# Patient Record
Sex: MALE | Race: WHITE | Employment: FULL TIME | URBAN - METROPOLITAN AREA
[De-identification: names, ages, dates, MRNs, and addresses within clinical notes are randomized per-mention and may not be internally consistent; named-entity substitution may affect disease eponyms.]

---

## 2019-05-09 ENCOUNTER — HOSPITAL ENCOUNTER (OUTPATIENT)
Age: 44
Discharge: HOME | End: 2019-05-09
Payer: MEDICARE

## 2019-05-09 LAB
INR BLD: 0.9
PROTHROMBIN TIME: 10.1 SEC (ref 9–12)

## 2019-05-09 PROCEDURE — 36415 COLL VENOUS BLD VENIPUNCTURE: CPT

## 2019-05-09 PROCEDURE — 85610 PROTHROMBIN TIME: CPT

## 2025-06-30 ENCOUNTER — HOSPITAL ENCOUNTER (INPATIENT)
Age: 50
LOS: 9 days | Discharge: HOME | DRG: 751 | End: 2025-07-09
Attending: EMERGENCY MEDICINE | Admitting: PSYCHIATRY & NEUROLOGY
Payer: MEDICAID

## 2025-06-30 ENCOUNTER — APPOINTMENT (OUTPATIENT)
Dept: CT IMAGING | Age: 50
DRG: 751 | End: 2025-06-30
Payer: MEDICAID

## 2025-06-30 DIAGNOSIS — T14.91XA SUICIDE ATTEMPT (HCC): Primary | ICD-10-CM

## 2025-06-30 DIAGNOSIS — R45.851 SUICIDAL IDEATION: ICD-10-CM

## 2025-06-30 PROBLEM — F32.2 MAJOR DEPRESSIVE DISORDER, SINGLE EPISODE, SEVERE WITHOUT PSYCHOTIC FEATURES (HCC): Status: ACTIVE | Noted: 2025-06-30

## 2025-06-30 PROBLEM — F32.A DEPRESSION WITH SUICIDAL IDEATION: Status: ACTIVE | Noted: 2025-06-30

## 2025-06-30 LAB
ALBUMIN SERPL-MCNC: 4.9 G/DL (ref 3.5–5.2)
ALP SERPL-CCNC: 60 U/L (ref 40–129)
ALT SERPL-CCNC: 35 U/L (ref 10–50)
AMPHET UR QL SCN: NEGATIVE
ANION GAP SERPL CALCULATED.3IONS-SCNC: 15 MMOL/L (ref 9–16)
APAP SERPL-MCNC: <5 UG/ML (ref 10–30)
AST SERPL-CCNC: 29 U/L (ref 10–50)
BACTERIA URNS QL MICRO: ABNORMAL
BARBITURATES UR QL SCN: NEGATIVE
BASOPHILS # BLD: 0.05 K/UL (ref 0–0.2)
BASOPHILS NFR BLD: 0 % (ref 0–2)
BENZODIAZ UR QL: NEGATIVE
BILIRUB DIRECT SERPL-MCNC: 0.2 MG/DL (ref 0–0.3)
BILIRUB INDIRECT SERPL-MCNC: 0.3 MG/DL (ref 0–1)
BILIRUB SERPL-MCNC: 0.5 MG/DL (ref 0–1.2)
BILIRUB UR QL STRIP: NEGATIVE
BUN SERPL-MCNC: 14 MG/DL (ref 6–20)
CALCIUM SERPL-MCNC: 9.8 MG/DL (ref 8.6–10.4)
CANNABINOIDS UR QL SCN: NEGATIVE
CASTS #/AREA URNS LPF: ABNORMAL /LPF
CHLORIDE SERPL-SCNC: 103 MMOL/L (ref 98–107)
CHOLEST SERPL-MCNC: 118 MG/DL (ref 0–199)
CHOLESTEROL/HDL RATIO: 5.1
CLARITY UR: CLEAR
CO2 SERPL-SCNC: 22 MMOL/L (ref 20–31)
COCAINE UR QL SCN: NEGATIVE
COLOR UR: YELLOW
CREAT SERPL-MCNC: 0.9 MG/DL (ref 0.7–1.2)
EOSINOPHIL # BLD: 0.12 K/UL (ref 0–0.44)
EOSINOPHILS RELATIVE PERCENT: 1 % (ref 0–4)
EPI CELLS #/AREA URNS HPF: ABNORMAL /HPF
ERYTHROCYTE [DISTWIDTH] IN BLOOD BY AUTOMATED COUNT: 13 % (ref 11.5–14.9)
ETHANOL PERCENT: 0 %
ETHANOLAMINE SERPL-MCNC: <10 MG/DL (ref 0–0.08)
FENTANYL UR QL: NEGATIVE
GFR, ESTIMATED: >90 ML/MIN/1.73M2
GLUCOSE BLD-MCNC: 143 MG/DL (ref 75–110)
GLUCOSE BLD-MCNC: 160 MG/DL (ref 75–110)
GLUCOSE BLD-MCNC: 181 MG/DL (ref 75–110)
GLUCOSE BLD-MCNC: 193 MG/DL (ref 75–110)
GLUCOSE BLD-MCNC: 203 MG/DL (ref 75–110)
GLUCOSE SERPL-MCNC: 147 MG/DL (ref 74–99)
GLUCOSE UR STRIP-MCNC: NEGATIVE MG/DL
HCT VFR BLD AUTO: 40.3 % (ref 41–53)
HDLC SERPL-MCNC: 23 MG/DL
HGB BLD-MCNC: 13.5 G/DL (ref 13.5–17.5)
HGB UR QL STRIP.AUTO: NEGATIVE
IMM GRANULOCYTES # BLD AUTO: 0.07 K/UL (ref 0–0.3)
IMM GRANULOCYTES NFR BLD: 1 %
KETONES UR STRIP-MCNC: ABNORMAL MG/DL
LDLC SERPL CALC-MCNC: 49 MG/DL (ref 0–100)
LEUKOCYTE ESTERASE UR QL STRIP: NEGATIVE
LEVETIRACETAM SERPL-MCNC: 33 UG/ML
LIPASE SERPL-CCNC: 44 U/L (ref 13–60)
LYMPHOCYTES NFR BLD: 2.5 K/UL (ref 1.1–3.7)
LYMPHOCYTES RELATIVE PERCENT: 22 % (ref 24–44)
MCH RBC QN AUTO: 28.2 PG (ref 26–34)
MCHC RBC AUTO-ENTMCNC: 33.5 G/DL (ref 31–37)
MCV RBC AUTO: 84.1 FL (ref 80–100)
METHADONE UR QL: NEGATIVE
MONOCYTES NFR BLD: 0.66 K/UL (ref 0.1–1.2)
MONOCYTES NFR BLD: 6 % (ref 3–12)
NEUTROPHILS NFR BLD: 70 % (ref 36–66)
NEUTS SEG NFR BLD: 7.76 K/UL (ref 1.5–8.1)
NITRITE UR QL STRIP: NEGATIVE
NRBC BLD-RTO: 0 PER 100 WBC
OPIATES UR QL SCN: NEGATIVE
OXYCODONE UR QL SCN: NEGATIVE
PCP UR QL SCN: NEGATIVE
PH UR STRIP: 5 [PH] (ref 5–8)
PLATELET # BLD AUTO: 367 K/UL (ref 150–450)
PMV BLD AUTO: 10.1 FL (ref 8–13.5)
POTASSIUM SERPL-SCNC: 3.4 MMOL/L (ref 3.7–5.3)
PROT SERPL-MCNC: 7.7 G/DL (ref 6.6–8.7)
PROT UR STRIP-MCNC: ABNORMAL MG/DL
RBC # BLD AUTO: 4.79 M/UL (ref 4.21–5.77)
RBC #/AREA URNS HPF: ABNORMAL /HPF
SALICYLATES SERPL-MCNC: <1 MG/DL (ref 0–10)
SODIUM SERPL-SCNC: 140 MMOL/L (ref 136–145)
SP GR UR STRIP: 1.02 (ref 1–1.03)
TEST INFORMATION: NORMAL
TRIGL SERPL-MCNC: 228 MG/DL (ref 0–149)
UROBILINOGEN UR STRIP-ACNC: NORMAL EU/DL (ref 0–1)
WBC #/AREA URNS HPF: ABNORMAL /HPF
WBC OTHER # BLD: 11.2 K/UL (ref 3.5–11)

## 2025-06-30 PROCEDURE — 81001 URINALYSIS AUTO W/SCOPE: CPT

## 2025-06-30 PROCEDURE — 80307 DRUG TEST PRSMV CHEM ANLYZR: CPT

## 2025-06-30 PROCEDURE — 83690 ASSAY OF LIPASE: CPT

## 2025-06-30 PROCEDURE — 70450 CT HEAD/BRAIN W/O DYE: CPT

## 2025-06-30 PROCEDURE — 99223 1ST HOSP IP/OBS HIGH 75: CPT | Performed by: PSYCHIATRY & NEUROLOGY

## 2025-06-30 PROCEDURE — 99285 EMERGENCY DEPT VISIT HI MDM: CPT

## 2025-06-30 PROCEDURE — 6370000000 HC RX 637 (ALT 250 FOR IP): Performed by: NURSE PRACTITIONER

## 2025-06-30 PROCEDURE — 82947 ASSAY GLUCOSE BLOOD QUANT: CPT

## 2025-06-30 PROCEDURE — 80061 LIPID PANEL: CPT

## 2025-06-30 PROCEDURE — 85025 COMPLETE CBC W/AUTO DIFF WBC: CPT

## 2025-06-30 PROCEDURE — 1240000000 HC EMOTIONAL WELLNESS R&B

## 2025-06-30 PROCEDURE — 6370000000 HC RX 637 (ALT 250 FOR IP): Performed by: INTERNAL MEDICINE

## 2025-06-30 PROCEDURE — 80076 HEPATIC FUNCTION PANEL: CPT

## 2025-06-30 PROCEDURE — 80143 DRUG ASSAY ACETAMINOPHEN: CPT

## 2025-06-30 PROCEDURE — G0480 DRUG TEST DEF 1-7 CLASSES: HCPCS

## 2025-06-30 PROCEDURE — 36415 COLL VENOUS BLD VENIPUNCTURE: CPT

## 2025-06-30 PROCEDURE — 99222 1ST HOSP IP/OBS MODERATE 55: CPT | Performed by: INTERNAL MEDICINE

## 2025-06-30 PROCEDURE — 80179 DRUG ASSAY SALICYLATE: CPT

## 2025-06-30 PROCEDURE — 80048 BASIC METABOLIC PNL TOTAL CA: CPT

## 2025-06-30 PROCEDURE — 80177 DRUG SCRN QUAN LEVETIRACETAM: CPT

## 2025-06-30 PROCEDURE — APPSS60 APP SPLIT SHARED TIME 46-60 MINUTES: Performed by: NURSE PRACTITIONER

## 2025-06-30 RX ORDER — ATORVASTATIN CALCIUM 80 MG/1
80 TABLET, FILM COATED ORAL NIGHTLY
Status: ON HOLD | COMMUNITY
End: 2025-07-09

## 2025-06-30 RX ORDER — DEXTROSE MONOHYDRATE 100 MG/ML
INJECTION, SOLUTION INTRAVENOUS CONTINUOUS PRN
Status: DISCONTINUED | OUTPATIENT
Start: 2025-06-30 | End: 2025-07-09 | Stop reason: HOSPADM

## 2025-06-30 RX ORDER — HYDROXYZINE HYDROCHLORIDE 50 MG/1
50 TABLET, FILM COATED ORAL 3 TIMES DAILY PRN
Status: DISCONTINUED | OUTPATIENT
Start: 2025-06-30 | End: 2025-07-09 | Stop reason: HOSPADM

## 2025-06-30 RX ORDER — IBUPROFEN 400 MG/1
400 TABLET, FILM COATED ORAL EVERY 6 HOURS PRN
Status: DISCONTINUED | OUTPATIENT
Start: 2025-06-30 | End: 2025-07-09 | Stop reason: HOSPADM

## 2025-06-30 RX ORDER — LEVETIRACETAM 500 MG/1
1000 TABLET ORAL 2 TIMES DAILY
Status: DISCONTINUED | OUTPATIENT
Start: 2025-06-30 | End: 2025-07-01

## 2025-06-30 RX ORDER — LEVETIRACETAM 500 MG/1
2000 TABLET, FILM COATED, EXTENDED RELEASE ORAL DAILY
Status: DISCONTINUED | OUTPATIENT
Start: 2025-06-30 | End: 2025-06-30

## 2025-06-30 RX ORDER — ASPIRIN 81 MG/1
81 TABLET, CHEWABLE ORAL DAILY
Status: DISCONTINUED | OUTPATIENT
Start: 2025-06-30 | End: 2025-07-01

## 2025-06-30 RX ORDER — INSULIN LISPRO 100 [IU]/ML
0-8 INJECTION, SOLUTION INTRAVENOUS; SUBCUTANEOUS
Status: DISCONTINUED | OUTPATIENT
Start: 2025-06-30 | End: 2025-06-30 | Stop reason: SDUPTHER

## 2025-06-30 RX ORDER — INSULIN LISPRO 100 [IU]/ML
0-8 INJECTION, SOLUTION INTRAVENOUS; SUBCUTANEOUS
Status: DISCONTINUED | OUTPATIENT
Start: 2025-06-30 | End: 2025-07-01

## 2025-06-30 RX ORDER — POTASSIUM CHLORIDE 1500 MG/1
40 TABLET, EXTENDED RELEASE ORAL ONCE
Status: COMPLETED | OUTPATIENT
Start: 2025-06-30 | End: 2025-06-30

## 2025-06-30 RX ORDER — ACETAMINOPHEN 325 MG/1
650 TABLET ORAL EVERY 4 HOURS PRN
Status: DISCONTINUED | OUTPATIENT
Start: 2025-06-30 | End: 2025-07-09 | Stop reason: HOSPADM

## 2025-06-30 RX ORDER — FENOFIBRATE 54 MG/1
54 TABLET ORAL DAILY
Status: DISCONTINUED | OUTPATIENT
Start: 2025-06-30 | End: 2025-07-09 | Stop reason: HOSPADM

## 2025-06-30 RX ORDER — MAGNESIUM HYDROXIDE/ALUMINUM HYDROXICE/SIMETHICONE 120; 1200; 1200 MG/30ML; MG/30ML; MG/30ML
30 SUSPENSION ORAL EVERY 6 HOURS PRN
Status: DISCONTINUED | OUTPATIENT
Start: 2025-06-30 | End: 2025-07-09 | Stop reason: HOSPADM

## 2025-06-30 RX ORDER — FENOFIBRATE 160 MG/1
160 TABLET ORAL DAILY
Status: ON HOLD | COMMUNITY
End: 2025-07-09 | Stop reason: HOSPADM

## 2025-06-30 RX ORDER — CHLORAL HYDRATE 500 MG
1000 CAPSULE ORAL DAILY
COMMUNITY

## 2025-06-30 RX ORDER — ATORVASTATIN CALCIUM 20 MG/1
80 TABLET, FILM COATED ORAL NIGHTLY
Status: DISCONTINUED | OUTPATIENT
Start: 2025-06-30 | End: 2025-07-09 | Stop reason: HOSPADM

## 2025-06-30 RX ORDER — LEVETIRACETAM 500 MG/1
2000 TABLET, FILM COATED, EXTENDED RELEASE ORAL 2 TIMES DAILY
COMMUNITY
Start: 2024-09-11

## 2025-06-30 RX ORDER — OMEGA-3-ACID ETHYL ESTERS 1 G/1
1 CAPSULE, LIQUID FILLED ORAL DAILY
Status: DISCONTINUED | OUTPATIENT
Start: 2025-06-30 | End: 2025-07-09 | Stop reason: HOSPADM

## 2025-06-30 RX ORDER — LACOSAMIDE 100 MG/1
100 TABLET ORAL 2 TIMES DAILY
COMMUNITY
Start: 2024-09-11

## 2025-06-30 RX ORDER — TRAZODONE HYDROCHLORIDE 50 MG/1
50 TABLET ORAL NIGHTLY PRN
Status: DISCONTINUED | OUTPATIENT
Start: 2025-06-30 | End: 2025-07-09 | Stop reason: HOSPADM

## 2025-06-30 RX ORDER — POLYETHYLENE GLYCOL 3350 17 G
2 POWDER IN PACKET (EA) ORAL
Status: DISCONTINUED | OUTPATIENT
Start: 2025-06-30 | End: 2025-07-09 | Stop reason: HOSPADM

## 2025-06-30 RX ORDER — LACOSAMIDE 100 MG/1
100 TABLET ORAL 2 TIMES DAILY
Status: DISCONTINUED | OUTPATIENT
Start: 2025-06-30 | End: 2025-07-09 | Stop reason: HOSPADM

## 2025-06-30 RX ORDER — RAMIPRIL 5 MG/1
5 CAPSULE ORAL DAILY
COMMUNITY

## 2025-06-30 RX ORDER — POLYETHYLENE GLYCOL 3350 17 G/17G
17 POWDER, FOR SOLUTION ORAL DAILY PRN
Status: DISCONTINUED | OUTPATIENT
Start: 2025-06-30 | End: 2025-07-09 | Stop reason: HOSPADM

## 2025-06-30 RX ADMIN — POTASSIUM CHLORIDE 40 MEQ: 1500 TABLET, EXTENDED RELEASE ORAL at 15:30

## 2025-06-30 RX ADMIN — LEVETIRACETAM 1000 MG: 500 TABLET, FILM COATED ORAL at 10:19

## 2025-06-30 RX ADMIN — LACOSAMIDE 100 MG: 100 TABLET, FILM COATED ORAL at 10:19

## 2025-06-30 RX ADMIN — METFORMIN HYDROCHLORIDE 1000 MG: 1000 TABLET ORAL at 17:46

## 2025-06-30 RX ADMIN — FENOFIBRATE 54 MG: 54 TABLET ORAL at 15:30

## 2025-06-30 RX ADMIN — ATORVASTATIN CALCIUM 80 MG: 20 TABLET, FILM COATED ORAL at 21:13

## 2025-06-30 RX ADMIN — LEVETIRACETAM 1000 MG: 500 TABLET, FILM COATED ORAL at 21:13

## 2025-06-30 RX ADMIN — LACOSAMIDE 100 MG: 100 TABLET, FILM COATED ORAL at 21:14

## 2025-06-30 RX ADMIN — OMEGA-3-ACID ETHYL ESTERS CAPSULES 1 CAPSULE: 1 CAPSULE, LIQUID FILLED ORAL at 15:30

## 2025-06-30 ASSESSMENT — LIFESTYLE VARIABLES
HOW OFTEN DO YOU HAVE A DRINK CONTAINING ALCOHOL: NEVER
HOW OFTEN DO YOU HAVE A DRINK CONTAINING ALCOHOL: NEVER
HOW MANY STANDARD DRINKS CONTAINING ALCOHOL DO YOU HAVE ON A TYPICAL DAY: PATIENT DOES NOT DRINK
HOW OFTEN DO YOU HAVE A DRINK CONTAINING ALCOHOL: NEVER
HOW OFTEN DO YOU HAVE A DRINK CONTAINING ALCOHOL: NEVER

## 2025-06-30 ASSESSMENT — PATIENT HEALTH QUESTIONNAIRE - PHQ9
SUM OF ALL RESPONSES TO PHQ QUESTIONS 1-9: 2
SUM OF ALL RESPONSES TO PHQ QUESTIONS 1-9: 2
1. LITTLE INTEREST OR PLEASURE IN DOING THINGS: SEVERAL DAYS
SUM OF ALL RESPONSES TO PHQ QUESTIONS 1-9: 2
SUM OF ALL RESPONSES TO PHQ QUESTIONS 1-9: 2
2. FEELING DOWN, DEPRESSED OR HOPELESS: SEVERAL DAYS

## 2025-06-30 ASSESSMENT — SLEEP AND FATIGUE QUESTIONNAIRES
DO YOU HAVE DIFFICULTY SLEEPING: YES
SLEEP PATTERN: DIFFICULTY FALLING ASLEEP;DISTURBED/INTERRUPTED SLEEP
DO YOU USE A SLEEP AID: NO
AVERAGE NUMBER OF SLEEP HOURS: 4

## 2025-06-30 ASSESSMENT — PAIN - FUNCTIONAL ASSESSMENT: PAIN_FUNCTIONAL_ASSESSMENT: NONE - DENIES PAIN

## 2025-06-30 NOTE — ED TRIAGE NOTES
Mode of arrival (squad #, walk in, police, etc) : [employer]        Chief complaint(s): suicide ideation with plan/ attempt         Arrival Note (brief scenario, treatment PTA, etc).: attempted to overdose on tylenol last week, reported to therapist who told him to call police if he felt he would do it again.           Deferred [x]      Reason for deferring: N/A    *If yes to two or more: probable alcohol abuse.*

## 2025-06-30 NOTE — ED PROVIDER NOTES
EMERGENCY DEPARTMENT ENCOUNTER    Pt Name: Dima Olmos  MRN: [id number]  Birthdate 1975  Date of evaluation: 6/30/25  CHIEF COMPLAINT       Chief Complaint   Patient presents with    Mental Health Problem     HISTORY OF PRESENT ILLNESS   49-year-old male presenting to the ER complaining of suicidal ideations.  Patient states he did attempt to overdose with 6 Tylenol's 6 days ago.    The history is provided by the patient.   Mental Health Problem  Presenting symptoms: suicidal thoughts    Presenting symptoms: no agitation    Associated symptoms: no abdominal pain, no chest pain and no fatigue            REVIEW OF SYSTEMS     Review of Systems   Constitutional:  Negative for activity change, fatigue and fever.   HENT:  Negative for congestion, ear pain and trouble swallowing.    Eyes:  Negative for photophobia and visual disturbance.   Respiratory:  Negative for cough and shortness of breath.    Cardiovascular:  Negative for chest pain and palpitations.   Gastrointestinal:  Negative for abdominal pain, diarrhea, nausea and vomiting.   Genitourinary:  Negative for dysuria, flank pain and urgency.   Musculoskeletal:  Negative for arthralgias and myalgias.   Skin:  Negative for color change and rash.   Neurological:  Negative for dizziness and facial asymmetry.   Psychiatric/Behavioral:  Positive for suicidal ideas. Negative for agitation and behavioral problems.      PASTMEDICAL HISTORY     Past Medical History:   Diagnosis Date    Diabetes (HCC)     Epilepsy (HCC)      Past Problem List  Patient Active Problem List   Diagnosis Code    Depression with suicidal ideation F32.A, R45.851     SURGICAL HISTORY     No past surgical history on file.  CURRENT MEDICATIONS       There are no discharge medications for this patient.    ALLERGIES     is allergic to demerol hcl [meperidine], depakote [divalproex sodium], and hydromorphone.  FAMILY HISTORY     has no family status information on file.      SOCIAL HISTORY       Social

## 2025-06-30 NOTE — H&P
has had 2 previous suicide attempts, both by overdose.  He endorses active suicidal thoughts at this time.  He is not able to elicit any manic/hypomanic episodes.  Denies any history of perceptual disturbances or psychotic phenomena.  Patient does report history of anxiety as noted above.  States that his anxiety has been worse recently and identifies ruminating on his stressors to the point of significant distress.  He also identifies difficulty focusing, psychomotor agitation with restlessness, chest tightness, heart palpitations, and racing thoughts.  He is uncertain if he is ever experienced any panic/anxiety attacks.      Patient is in emotional distress and expressing active suicidal ideation.  He requires inpatient hospitalization for stabilization.           History of head trauma: [] Yes [x] No    History of seizures: [x] Yes [] No -sees a neurologist.  Has been seizure-free since 2016.  Takes Keppra.    History of violence or aggression: [] Yes [x] No         PSYCHIATRIC HISTORY:  [x] Yes [] No    Currently follows with a therapist Giacomo Mora  Reports multiple lifetime suicide attempts  First time psychiatric hospital admissions    Home Medication Compliance: [x] Yes [] No    Past psychiatric medications includes: Escitalopram    Adverse reactions from psychotropic medications: [x] Yes [] No  Depakote - took for seizures, reports did not tolerate some of his anticonvulsants well and developed pancreatitis         Lifetime Psychiatric Review of Systems         Depression: Endorses     Anxiety: Endorses     Panic Attacks: Denies     Carlee or Hypomania: Denies     Phobias: Denies     Obsessions and Compulsions: Denies     Body or Vocal Tics: Denies     Visual Hallucinations: Denies     Auditory Hallucinations: Denies     Delusions/Paranoia: Denies     PTSD: Denies    Past Medical History:        Diagnosis Date    Diabetes (HCC)     Epilepsy (HCC)        Past Surgical History:    History reviewed. No 
      Plan:   49-year-old male admitted at [facility] for further management of depression suicidal ideation  Type 2 diabetes mellitus, on metformin give sliding scale check HbA1c  Hypokalemia replace potassium  Angina, patient currently denies any chest pain already been seen by cardiology as outpatient recommended echocardiogram and stress as outpatient, will start aspirin  Hyperlipidemia on Lipitor and fenofibrate, check lipid panel  Leukocytosis likely reactive no urinary symptoms  Hypertension blood pressure on the borderline lower side on ramipril will hold continue to monitor  Epilepsy on Vimpat and Keppra  Consultations:   IP CONSULT TO INTERNAL MEDICINE      Katerina Norman MD  6/30/2025  1:41 PM    Copy sent to Christine Ott, DO    Please note that this chart was generated using voice recognition Dragon dictation software.  Although every effort was made to ensure the accuracy of this automated transcription, some errors in transcription may have occurred.

## 2025-06-30 NOTE — CARE COORDINATION
[facility] Biopsychosocial Assessment    Current Level of Psychosocial Functioning     Independent   Dependent    Minimal Assist X      Psychosocial High Risk Factors (check all that apply)    Unable to obtain meds   Chronic illness/pain    Substance abuse   Lack of Family Support X  Financial stress X  Isolation X  Inadequate Community Resources  Suicide attempt(s) X  Not taking medications X  Victim of crime   Developmental Delay  Unable to manage personal needs  X  Age 65 or older   Homeless  No transportation   Readmission within 30 days  Unemployment X  Traumatic Event      Psychiatric Advanced Directives: none reported     Family to Involve in Treatment: Lack of family support     Sexual Orientation:  VIKI    Patient Strengths: adequate housing, follows up with a therapist, insurance     Patient Barriers: presents on admission following suicide attempt of overdosing on Tylenol medication, relationship issues with his wife, recent increase in symptoms of Depression, not linked with [facility], not taking Psychiatric medications.     Opiate Education Provided: N/A Patient denies and does not have any documented history of Opiate or Heroin use/abuse. Patient denies any Alcohol or Illicit drug use. Patient's drug screen upon admission was negative for all substances.      [facility]/mental health history: Not linked for medications, sees Therapist Giacomo Mora, [facility] [phone], relationship issues with his wife, unsure if he is able to return home, no income at this time,no current or past ANGÉLICA issues     Plan of Care   medication management, group/individual therapies, family meetings, psycho -education, treatment team meetings to assist with stabilization    Initial Discharge Plan:  To Be Determined. Patient will be provided with Community resource information       Clinical Summary:  Dima Olmos is a 49 y.o. male who presented on admission with active suicidal ideation with a plan to overdose.  Patient had

## 2025-06-30 NOTE — GROUP NOTE
Group Therapy Note    Date: 6/30/2025    Group Start Time: 1330  Group End Time: 1400  Group Topic: Healthy Living/Wellness    [facility]    Giovanna Quach CTRS        Group Therapy Note    Attendees: 5/14     Topic: To increase social interaction , education and discussion r/t benefits of peer support group   [facility] resources, services, and locations by Guest Presenter.           Comments:   Patient did not participate in Healthy Living and Wellness Group (Presentation by [facility] Organization)   at 13:30, despite staff encouragement and explanation of benefits. Pt was seclusive to self and room.     Q15 minute safety checks maintained for patient safety and will continue to encourage patient to   attend unit programming.         Discipline Responsible: Psychoeducational Specialist   Signature: REMY GIL

## 2025-06-30 NOTE — GROUP NOTE
Group Therapy Note    Date: 6/30/2025    Group Start Time: 1100  Group End Time: 1130  Group Topic: Nursing    [facility]    Stephanie Bennett LPN        Group Therapy Note    Attendees: 4/14       Patient was offered group therapy today but declined to participate despite encouragement from staff. 1:1 was offered.           Signature:  Stephanie Bennett LPN

## 2025-07-01 LAB — GLUCOSE BLD-MCNC: 135 MG/DL (ref 75–110)

## 2025-07-01 PROCEDURE — 99232 SBSQ HOSP IP/OBS MODERATE 35: CPT | Performed by: INTERNAL MEDICINE

## 2025-07-01 PROCEDURE — APPSS30 APP SPLIT SHARED TIME 16-30 MINUTES

## 2025-07-01 PROCEDURE — 99232 SBSQ HOSP IP/OBS MODERATE 35: CPT | Performed by: PSYCHIATRY & NEUROLOGY

## 2025-07-01 PROCEDURE — 6370000000 HC RX 637 (ALT 250 FOR IP): Performed by: PSYCHIATRY & NEUROLOGY

## 2025-07-01 PROCEDURE — 6370000000 HC RX 637 (ALT 250 FOR IP): Performed by: NURSE PRACTITIONER

## 2025-07-01 PROCEDURE — 1240000000 HC EMOTIONAL WELLNESS R&B

## 2025-07-01 PROCEDURE — 90833 PSYTX W PT W E/M 30 MIN: CPT | Performed by: PSYCHIATRY & NEUROLOGY

## 2025-07-01 PROCEDURE — 6370000000 HC RX 637 (ALT 250 FOR IP): Performed by: INTERNAL MEDICINE

## 2025-07-01 PROCEDURE — 82947 ASSAY GLUCOSE BLOOD QUANT: CPT

## 2025-07-01 RX ORDER — LISINOPRIL 20 MG/1
20 TABLET ORAL DAILY
Status: DISCONTINUED | OUTPATIENT
Start: 2025-07-01 | End: 2025-07-09 | Stop reason: HOSPADM

## 2025-07-01 RX ORDER — LEVETIRACETAM 500 MG/1
2000 TABLET ORAL 2 TIMES DAILY
Status: DISCONTINUED | OUTPATIENT
Start: 2025-07-01 | End: 2025-07-09 | Stop reason: HOSPADM

## 2025-07-01 RX ORDER — MIRTAZAPINE 15 MG/1
7.5 TABLET, FILM COATED ORAL NIGHTLY
Status: DISCONTINUED | OUTPATIENT
Start: 2025-07-01 | End: 2025-07-02

## 2025-07-01 RX ORDER — OLANZAPINE 5 MG/1
2.5 TABLET, ORALLY DISINTEGRATING ORAL 2 TIMES DAILY
Status: DISCONTINUED | OUTPATIENT
Start: 2025-07-01 | End: 2025-07-02

## 2025-07-01 RX ADMIN — LISINOPRIL 20 MG: 20 TABLET ORAL at 15:06

## 2025-07-01 RX ADMIN — MIRTAZAPINE 7.5 MG: 15 TABLET, FILM COATED ORAL at 20:57

## 2025-07-01 RX ADMIN — LACOSAMIDE 100 MG: 100 TABLET, FILM COATED ORAL at 07:54

## 2025-07-01 RX ADMIN — ATORVASTATIN CALCIUM 80 MG: 20 TABLET, FILM COATED ORAL at 20:57

## 2025-07-01 RX ADMIN — OMEGA-3-ACID ETHYL ESTERS CAPSULES 1 CAPSULE: 1 CAPSULE, LIQUID FILLED ORAL at 07:58

## 2025-07-01 RX ADMIN — METFORMIN HYDROCHLORIDE 1000 MG: 1000 TABLET ORAL at 07:54

## 2025-07-01 RX ADMIN — METFORMIN HYDROCHLORIDE 1000 MG: 1000 TABLET ORAL at 17:29

## 2025-07-01 RX ADMIN — LEVETIRACETAM 1000 MG: 500 TABLET, FILM COATED ORAL at 07:54

## 2025-07-01 RX ADMIN — OLANZAPINE 2.5 MG: 5 TABLET, ORALLY DISINTEGRATING ORAL at 20:57

## 2025-07-01 RX ADMIN — OLANZAPINE 2.5 MG: 5 TABLET, ORALLY DISINTEGRATING ORAL at 15:14

## 2025-07-01 RX ADMIN — LEVETIRACETAM 2000 MG: 500 TABLET, FILM COATED ORAL at 20:57

## 2025-07-01 RX ADMIN — FENOFIBRATE 54 MG: 54 TABLET ORAL at 07:58

## 2025-07-01 RX ADMIN — LACOSAMIDE 100 MG: 100 TABLET, FILM COATED ORAL at 20:57

## 2025-07-01 ASSESSMENT — PAIN SCALES - GENERAL: PAINLEVEL_OUTOF10: 0

## 2025-07-01 NOTE — GROUP NOTE
Group Therapy Note    Date: 7/1/2025    Group Start Time: 1015  Group End Time: 1045  Group Topic: Psychoeducation    [facility]    Andrea Mcclain        Group Therapy Note    Attendees: 5/10     Patient was offered group therapy today but declined to participate despite encouragement from staff. 1:1 was offered.    Signature:  Andrea Mcclain

## 2025-07-01 NOTE — GROUP NOTE
patient refused to attend goal group at 0900 after encouragement from staff.  1:1 talk time provided as alternative to group session

## 2025-07-01 NOTE — GROUP NOTE
Group Therapy Note    Date: 7/1/2025    Group Start Time: 1100  Group End Time: 1145  Group Topic: Cognitive Skills    [facility]    Giovanna Quach CTRS        Group Therapy Note    Attendees: 5/10     Topic: To increase social interaction , practice problem solving, making connections with ideas, and   communication skills.           Comments:   Patient did not participate in Cognitive Skills Group at 11:00, despite staff encouragement and explanation of benefits. Pt was seclusive to self and room.     Q15 minute safety checks maintained for patient safety and will continue to encourage patient to   attend unit programming.         Discipline Responsible: Psychoeducational Specialist   Signature: REMY GIL

## 2025-07-02 PROCEDURE — 90833 PSYTX W PT W E/M 30 MIN: CPT | Performed by: PSYCHIATRY & NEUROLOGY

## 2025-07-02 PROCEDURE — 6370000000 HC RX 637 (ALT 250 FOR IP): Performed by: INTERNAL MEDICINE

## 2025-07-02 PROCEDURE — APPSS30 APP SPLIT SHARED TIME 16-30 MINUTES

## 2025-07-02 PROCEDURE — 6370000000 HC RX 637 (ALT 250 FOR IP): Performed by: PSYCHIATRY & NEUROLOGY

## 2025-07-02 PROCEDURE — 99232 SBSQ HOSP IP/OBS MODERATE 35: CPT | Performed by: PSYCHIATRY & NEUROLOGY

## 2025-07-02 PROCEDURE — 1240000000 HC EMOTIONAL WELLNESS R&B

## 2025-07-02 PROCEDURE — 6370000000 HC RX 637 (ALT 250 FOR IP): Performed by: NURSE PRACTITIONER

## 2025-07-02 PROCEDURE — 99232 SBSQ HOSP IP/OBS MODERATE 35: CPT | Performed by: INTERNAL MEDICINE

## 2025-07-02 RX ORDER — OLANZAPINE 5 MG/1
2.5 TABLET, ORALLY DISINTEGRATING ORAL NIGHTLY
Status: DISCONTINUED | OUTPATIENT
Start: 2025-07-02 | End: 2025-07-03

## 2025-07-02 RX ORDER — MIRTAZAPINE 15 MG/1
15 TABLET, FILM COATED ORAL NIGHTLY
Status: DISCONTINUED | OUTPATIENT
Start: 2025-07-02 | End: 2025-07-09 | Stop reason: HOSPADM

## 2025-07-02 RX ADMIN — ATORVASTATIN CALCIUM 80 MG: 20 TABLET, FILM COATED ORAL at 20:53

## 2025-07-02 RX ADMIN — LACOSAMIDE 100 MG: 100 TABLET, FILM COATED ORAL at 08:33

## 2025-07-02 RX ADMIN — OLANZAPINE 2.5 MG: 5 TABLET, ORALLY DISINTEGRATING ORAL at 08:33

## 2025-07-02 RX ADMIN — LISINOPRIL 20 MG: 20 TABLET ORAL at 08:33

## 2025-07-02 RX ADMIN — LEVETIRACETAM 2000 MG: 500 TABLET, FILM COATED ORAL at 20:52

## 2025-07-02 RX ADMIN — OLANZAPINE 2.5 MG: 5 TABLET, ORALLY DISINTEGRATING ORAL at 20:53

## 2025-07-02 RX ADMIN — OMEGA-3-ACID ETHYL ESTERS CAPSULES 1 CAPSULE: 1 CAPSULE, LIQUID FILLED ORAL at 08:33

## 2025-07-02 RX ADMIN — METFORMIN HYDROCHLORIDE 1000 MG: 1000 TABLET ORAL at 17:10

## 2025-07-02 RX ADMIN — LACOSAMIDE 100 MG: 100 TABLET, FILM COATED ORAL at 20:52

## 2025-07-02 RX ADMIN — MIRTAZAPINE 15 MG: 15 TABLET, FILM COATED ORAL at 20:52

## 2025-07-02 RX ADMIN — FENOFIBRATE 54 MG: 54 TABLET ORAL at 08:33

## 2025-07-02 RX ADMIN — METFORMIN HYDROCHLORIDE 1000 MG: 1000 TABLET ORAL at 08:33

## 2025-07-02 RX ADMIN — LEVETIRACETAM 2000 MG: 500 TABLET, FILM COATED ORAL at 08:33

## 2025-07-02 ASSESSMENT — LIFESTYLE VARIABLES
HOW OFTEN DO YOU HAVE A DRINK CONTAINING ALCOHOL: NEVER
HOW MANY STANDARD DRINKS CONTAINING ALCOHOL DO YOU HAVE ON A TYPICAL DAY: PATIENT DOES NOT DRINK

## 2025-07-02 NOTE — GROUP NOTE
Group Therapy Note    Date: 7/2/2025    Group Start Time: 1100  Group End Time: 1150  Group Topic: Recreational    [facility]    Giovanna Quach CTRS        Group Therapy Note    Attendees: 2/9     Topic: To increase social interaction , practice self expression, relating to peers through discussion about leisure skills and interests.           Comments:   Patient did not participate in Cognitive Skills Group at 11:00, despite staff encouragement and explanation   of benefits. Pt was seclusive to self and room.     Q15 minute safety checks maintained for patient safety and will continue to encourage patient to   attend unit programming.         Discipline Responsible: Psychoeducational Specialist   Signature: REMY GIL

## 2025-07-02 NOTE — GROUP NOTE
Group Therapy Note    Date: 7/2/2025    Group Start Time: 1000  Group End Time: 1030  Group Topic: Psychoeducation    [facility]        Group Therapy Note    Attendees: 2/9     Patient was offered group therapy today but declined to participate despite encouragement from staff. 1:1 was offered.    Signature:  Andrea cMclain

## 2025-07-03 LAB — GLUCOSE BLD-MCNC: 194 MG/DL (ref 75–110)

## 2025-07-03 PROCEDURE — 6370000000 HC RX 637 (ALT 250 FOR IP): Performed by: NURSE PRACTITIONER

## 2025-07-03 PROCEDURE — GZHZZZZ GROUP PSYCHOTHERAPY: ICD-10-PCS | Performed by: PSYCHIATRY & NEUROLOGY

## 2025-07-03 PROCEDURE — 82947 ASSAY GLUCOSE BLOOD QUANT: CPT

## 2025-07-03 PROCEDURE — 6370000000 HC RX 637 (ALT 250 FOR IP): Performed by: PSYCHIATRY & NEUROLOGY

## 2025-07-03 PROCEDURE — 1240000000 HC EMOTIONAL WELLNESS R&B

## 2025-07-03 PROCEDURE — 6370000000 HC RX 637 (ALT 250 FOR IP): Performed by: INTERNAL MEDICINE

## 2025-07-03 PROCEDURE — APPSS30 APP SPLIT SHARED TIME 16-30 MINUTES

## 2025-07-03 PROCEDURE — 99232 SBSQ HOSP IP/OBS MODERATE 35: CPT | Performed by: INTERNAL MEDICINE

## 2025-07-03 PROCEDURE — 99232 SBSQ HOSP IP/OBS MODERATE 35: CPT | Performed by: PSYCHIATRY & NEUROLOGY

## 2025-07-03 RX ORDER — OLANZAPINE 10 MG/1
5 TABLET, ORALLY DISINTEGRATING ORAL NIGHTLY
Status: DISCONTINUED | OUTPATIENT
Start: 2025-07-03 | End: 2025-07-05

## 2025-07-03 RX ADMIN — LEVETIRACETAM 2000 MG: 500 TABLET, FILM COATED ORAL at 21:08

## 2025-07-03 RX ADMIN — OMEGA-3-ACID ETHYL ESTERS CAPSULES 1 CAPSULE: 1 CAPSULE, LIQUID FILLED ORAL at 08:48

## 2025-07-03 RX ADMIN — ATORVASTATIN CALCIUM 80 MG: 20 TABLET, FILM COATED ORAL at 21:08

## 2025-07-03 RX ADMIN — LISINOPRIL 20 MG: 20 TABLET ORAL at 08:06

## 2025-07-03 RX ADMIN — LACOSAMIDE 100 MG: 100 TABLET, FILM COATED ORAL at 21:09

## 2025-07-03 RX ADMIN — METFORMIN HYDROCHLORIDE 1000 MG: 1000 TABLET ORAL at 08:05

## 2025-07-03 RX ADMIN — FENOFIBRATE 54 MG: 54 TABLET ORAL at 08:48

## 2025-07-03 RX ADMIN — OLANZAPINE 5 MG: 10 TABLET, ORALLY DISINTEGRATING ORAL at 21:09

## 2025-07-03 RX ADMIN — MIRTAZAPINE 15 MG: 15 TABLET, FILM COATED ORAL at 21:09

## 2025-07-03 RX ADMIN — LACOSAMIDE 100 MG: 100 TABLET, FILM COATED ORAL at 08:05

## 2025-07-03 RX ADMIN — LEVETIRACETAM 2000 MG: 500 TABLET, FILM COATED ORAL at 08:05

## 2025-07-03 RX ADMIN — METFORMIN HYDROCHLORIDE 1000 MG: 1000 TABLET ORAL at 17:22

## 2025-07-03 NOTE — GROUP NOTE
Group Therapy Note    Date: 7/3/2025    Group Start Time: 1100  Group End Time: 1150  Group Topic: Recreational    [facility]    Giovanna Quach CTRS        Group Therapy Note    Attendees: 3/9     Patient's Goal: To increase social interaction , practice self expression, explore feelings and identify current   needs and wants r/t positive mental health. Also exploring future behaviors and resources r/t maintaing   positive mental health          Notes: Pt started group stating he would just listen, and appeared depressed, stated \"I just don't care anymore,   I've given up\".  Pt continued in negative vein of conversation initially but with encouragement was willing to look for a book to read . Pt found a couple of books he said would stimulate his brain, and then participated fully in group discussion.    Pt was able to practice self expression, explore feelings and identify simple current needs and wants r/t  mental health.     Also exploring future behaviors and resources r/t maintaining positive mental health as a general topic with peers.     Pt engaged in discussion and was pleasant and supportive to peers..            Status After Intervention:  Improved     Participation Level: Active Listener,  sharing, supportive     Participation Quality:  Attentive, sharing, supportive        Speech:  Normal     Thought Process/Content: Logical, Linear.      Affective Functioning: Blunted     Mood: Pleasant on approach, cooperative. Pt remains dysthymic and  overwhelmed thinking about housing issue at discharge, as well as losses r/t marriage. Able to encourage positivity in peers.    Level of consciousness:  Alert, and Attentive        Response to Learning:  Able to verbalize current knowledge, able to acknowledge/verbalize new learning,    Progressing to goal        Endings: None Reported     Modes of Intervention: Education, Support,

## 2025-07-03 NOTE — GROUP NOTE
Group Therapy Note    Date: 7/3/2025    Group Start Time: 1000  Group End Time: 1030  Group Topic: Psychoeducation    [facility]    Andrea [facility]        Group Therapy Note    Attendees: 2/8     Patient was offered group therapy today but declined to participate despite encouragement from staff. 1:1 was offered.    Signature:  Andrea Mcclain

## 2025-07-03 NOTE — GROUP NOTE
Group Therapy Note    Date: 7/3/2025    Group Start Time: 1440  Group End Time: 1545  Group Topic: Cognitive Skills    [facility]    Giovanna Quach CTRS        Group Therapy Note    Attendees: 3/9     Patient's Goal:  To increase social interaction, practice self expression, and explore positive coping r/t feelings  through discussion.      Notes: Pt participated fully in group . Pt was able to practice self expression, and explore positive coping   r/t feelings through discussion.    Pt shared individually and was supportive of peers sharing, able to relate to some of peers' ideas and feelings.         Status After Intervention:  Improved     Participation Level: Active Listener,  sharing, supportive     Participation Quality: Appropriate,  Attentive, sharing, supportive        Speech:  Normal      Thought Process/Content: Logical, Linear.      Affective Functioning: Blunted, brightened     Mood: Dysthymic but did improve as group progressed, identifying small steps in terms of present that pt could work on, initially increasing engagement in reading, word puzzles to keep concentration and thought process active and improving.     Level of consciousness:  Alert, and Attentive        Response to Learning:  Able to verbalize current knowledge, able to acknowledge/verbalize new learning,    Progressing to goal        Endings: None Reported     Modes of Intervention: Education, Support, Socialization, Exploration, Clarifying and Problem-solving        Discipline Responsible: Psychoeducational Specialist        Signature:  REMY GIL

## 2025-07-04 PROCEDURE — 6370000000 HC RX 637 (ALT 250 FOR IP): Performed by: INTERNAL MEDICINE

## 2025-07-04 PROCEDURE — 6370000000 HC RX 637 (ALT 250 FOR IP): Performed by: NURSE PRACTITIONER

## 2025-07-04 PROCEDURE — 99232 SBSQ HOSP IP/OBS MODERATE 35: CPT | Performed by: PSYCHIATRY & NEUROLOGY

## 2025-07-04 PROCEDURE — 99232 SBSQ HOSP IP/OBS MODERATE 35: CPT | Performed by: INTERNAL MEDICINE

## 2025-07-04 PROCEDURE — 6370000000 HC RX 637 (ALT 250 FOR IP): Performed by: PSYCHIATRY & NEUROLOGY

## 2025-07-04 PROCEDURE — 90833 PSYTX W PT W E/M 30 MIN: CPT | Performed by: PSYCHIATRY & NEUROLOGY

## 2025-07-04 PROCEDURE — 1240000000 HC EMOTIONAL WELLNESS R&B

## 2025-07-04 RX ADMIN — FENOFIBRATE 54 MG: 54 TABLET ORAL at 09:58

## 2025-07-04 RX ADMIN — ATORVASTATIN CALCIUM 80 MG: 20 TABLET, FILM COATED ORAL at 20:45

## 2025-07-04 RX ADMIN — LEVETIRACETAM 2000 MG: 500 TABLET, FILM COATED ORAL at 20:46

## 2025-07-04 RX ADMIN — LACOSAMIDE 100 MG: 100 TABLET, FILM COATED ORAL at 20:46

## 2025-07-04 RX ADMIN — LISINOPRIL 20 MG: 20 TABLET ORAL at 09:58

## 2025-07-04 RX ADMIN — LEVETIRACETAM 2000 MG: 500 TABLET, FILM COATED ORAL at 09:58

## 2025-07-04 RX ADMIN — LACOSAMIDE 100 MG: 100 TABLET, FILM COATED ORAL at 09:58

## 2025-07-04 RX ADMIN — OLANZAPINE 5 MG: 10 TABLET, ORALLY DISINTEGRATING ORAL at 20:46

## 2025-07-04 RX ADMIN — METFORMIN HYDROCHLORIDE 1000 MG: 1000 TABLET ORAL at 17:33

## 2025-07-04 RX ADMIN — METFORMIN HYDROCHLORIDE 1000 MG: 1000 TABLET ORAL at 09:58

## 2025-07-04 RX ADMIN — OMEGA-3-ACID ETHYL ESTERS CAPSULES 1 CAPSULE: 1 CAPSULE, LIQUID FILLED ORAL at 09:58

## 2025-07-04 RX ADMIN — MIRTAZAPINE 15 MG: 15 TABLET, FILM COATED ORAL at 20:46

## 2025-07-04 NOTE — GROUP NOTE
Group Therapy Note    Date: 7/3/2025    Group Start Time: 2000  Group End Time: 2030  Group Topic: Relaxation    [facility]    Analia Kim      Group Therapy Note    Attendees: 3/10             Pt refused to attend Relaxation group this evening after encouragement from staff.  1:1 talk time offered as alternative to group session but pt declined. Will continue to encourage patient to attend unit group programming.                 Signature:  Analia Kim

## 2025-07-04 NOTE — GROUP NOTE
Group Therapy Note    Date: 7/4/2025    Group Start Time: 0900  Group End Time: 0910  Group Topic: Group Documentation    Abigail Kenny, RN        Group Therapy Note    Attendees: 0/12  Community Meeting Group Note        Date: July 4, 2025 Start Time: 9am  End Time: 9:10am      Number of Participants in Group & Unit Census:  1/12    Topic: Goals group    Goal of Group:To establish attainable daily goal(s)      Comments:     Patient did not participate in Community Meeting group, despite staff encouragement and explanation of benefits.  Patient remain seclusive to self.  Q15 minute safety checks maintained for patient safety and will continue to encourage patient to attend unit programming.

## 2025-07-04 NOTE — GROUP NOTE
Group Therapy Note    Date: 7/3/2025    Group Start Time: 2000  Group End Time: 2030  Group Topic: Relaxation    [facility]    Analia Kim    Group Therapy Note    Attendees: 3/10    Patient's Goal:  To acquire coping skills by developing relaxation techniques    Notes:  Pt attended and actively participated in the Relaxation group this evening.    Status After Intervention:  Improved    Participation Level: Interactive    Participation Quality: Appropriate and Attentive    Speech:  normal    Thought Process/Content: Logical    Affective Functioning: Congruent    Mood: calm and attempting to relax    Level of consciousness:  Alert and Oriented x4    Response to Learning: Progressing to goal    Endings: None Reported    Modes of Intervention: Education, Support, and Exploration    Discipline Responsible: Registered Nurse               Signature:  Analia Kim

## 2025-07-05 ENCOUNTER — APPOINTMENT (OUTPATIENT)
Dept: CT IMAGING | Age: 50
DRG: 751 | End: 2025-07-05
Payer: MEDICAID

## 2025-07-05 LAB
ALBUMIN SERPL-MCNC: 4.4 G/DL (ref 3.5–5.2)
ALP SERPL-CCNC: 57 U/L (ref 40–129)
ALT SERPL-CCNC: 34 U/L (ref 10–50)
ANION GAP SERPL CALCULATED.3IONS-SCNC: 16 MMOL/L (ref 9–16)
AST SERPL-CCNC: 29 U/L (ref 10–50)
BACTERIA URNS QL MICRO: ABNORMAL
BILIRUB DIRECT SERPL-MCNC: 0.2 MG/DL (ref 0–0.3)
BILIRUB INDIRECT SERPL-MCNC: 0.2 MG/DL (ref 0–1)
BILIRUB SERPL-MCNC: 0.4 MG/DL (ref 0–1.2)
BILIRUB UR QL STRIP: NEGATIVE
BUN SERPL-MCNC: 21 MG/DL (ref 6–20)
C DIFF GDH + TOXINS A+B STL QL IA.RAPID: NEGATIVE
CALCIUM SERPL-MCNC: 10.1 MG/DL (ref 8.6–10.4)
CASTS #/AREA URNS LPF: ABNORMAL /LPF
CHLORIDE SERPL-SCNC: 107 MMOL/L (ref 98–107)
CLARITY UR: ABNORMAL
CO2 SERPL-SCNC: 18 MMOL/L (ref 20–31)
COLOR UR: YELLOW
CREAT SERPL-MCNC: 1 MG/DL (ref 0.7–1.2)
EPI CELLS #/AREA URNS HPF: ABNORMAL /HPF
ERYTHROCYTE [DISTWIDTH] IN BLOOD BY AUTOMATED COUNT: 13.2 % (ref 11.5–14.9)
GFR, ESTIMATED: >90 ML/MIN/1.73M2
GLUCOSE SERPL-MCNC: 246 MG/DL (ref 74–99)
GLUCOSE UR STRIP-MCNC: ABNORMAL MG/DL
HCT VFR BLD AUTO: 40.6 % (ref 41–53)
HGB BLD-MCNC: 13 G/DL (ref 13.5–17.5)
HGB UR QL STRIP.AUTO: NEGATIVE
KETONES UR STRIP-MCNC: ABNORMAL MG/DL
LEUKOCYTE ESTERASE UR QL STRIP: NEGATIVE
LIPASE SERPL-CCNC: 24 U/L (ref 13–60)
MCH RBC QN AUTO: 28.3 PG (ref 26–34)
MCHC RBC AUTO-ENTMCNC: 32 G/DL (ref 31–37)
MCV RBC AUTO: 88.3 FL (ref 80–100)
NITRITE UR QL STRIP: NEGATIVE
NRBC BLD-RTO: 0 PER 100 WBC
PH UR STRIP: 5.5 [PH] (ref 5–8)
PLATELET # BLD AUTO: 313 K/UL (ref 150–450)
PMV BLD AUTO: 10.6 FL (ref 8–13.5)
POTASSIUM SERPL-SCNC: 4.7 MMOL/L (ref 3.7–5.3)
POTASSIUM SERPL-SCNC: 5.4 MMOL/L (ref 3.7–5.3)
PROT SERPL-MCNC: 7.2 G/DL (ref 6.6–8.7)
PROT UR STRIP-MCNC: ABNORMAL MG/DL
RBC # BLD AUTO: 4.6 M/UL (ref 4.21–5.77)
RBC #/AREA URNS HPF: ABNORMAL /HPF
SODIUM SERPL-SCNC: 141 MMOL/L (ref 136–145)
SP GR UR STRIP: 1.03 (ref 1–1.03)
SPECIMEN DESCRIPTION: NORMAL
UROBILINOGEN UR STRIP-ACNC: NORMAL EU/DL (ref 0–1)
WBC #/AREA URNS HPF: ABNORMAL /HPF
WBC OTHER # BLD: 16.1 K/UL (ref 3.5–11)

## 2025-07-05 PROCEDURE — 6370000000 HC RX 637 (ALT 250 FOR IP): Performed by: PSYCHIATRY & NEUROLOGY

## 2025-07-05 PROCEDURE — 2580000003 HC RX 258: Performed by: NURSE PRACTITIONER

## 2025-07-05 PROCEDURE — 83690 ASSAY OF LIPASE: CPT

## 2025-07-05 PROCEDURE — 6370000000 HC RX 637 (ALT 250 FOR IP): Performed by: INTERNAL MEDICINE

## 2025-07-05 PROCEDURE — 36415 COLL VENOUS BLD VENIPUNCTURE: CPT

## 2025-07-05 PROCEDURE — 6370000000 HC RX 637 (ALT 250 FOR IP): Performed by: NURSE PRACTITIONER

## 2025-07-05 PROCEDURE — 80048 BASIC METABOLIC PNL TOTAL CA: CPT

## 2025-07-05 PROCEDURE — 99232 SBSQ HOSP IP/OBS MODERATE 35: CPT | Performed by: PSYCHIATRY & NEUROLOGY

## 2025-07-05 PROCEDURE — 74176 CT ABD & PELVIS W/O CONTRAST: CPT

## 2025-07-05 PROCEDURE — 84132 ASSAY OF SERUM POTASSIUM: CPT

## 2025-07-05 PROCEDURE — APPSS30 APP SPLIT SHARED TIME 16-30 MINUTES: Performed by: NURSE PRACTITIONER

## 2025-07-05 PROCEDURE — 85027 COMPLETE CBC AUTOMATED: CPT

## 2025-07-05 PROCEDURE — 81001 URINALYSIS AUTO W/SCOPE: CPT

## 2025-07-05 PROCEDURE — 99233 SBSQ HOSP IP/OBS HIGH 50: CPT | Performed by: INTERNAL MEDICINE

## 2025-07-05 PROCEDURE — 87449 NOS EACH ORGANISM AG IA: CPT

## 2025-07-05 PROCEDURE — 1240000000 HC EMOTIONAL WELLNESS R&B

## 2025-07-05 PROCEDURE — 80076 HEPATIC FUNCTION PANEL: CPT

## 2025-07-05 PROCEDURE — 87324 CLOSTRIDIUM AG IA: CPT

## 2025-07-05 RX ORDER — LOPERAMIDE HYDROCHLORIDE 2 MG/1
2 CAPSULE ORAL 4 TIMES DAILY PRN
Status: DISCONTINUED | OUTPATIENT
Start: 2025-07-05 | End: 2025-07-06

## 2025-07-05 RX ORDER — SODIUM CHLORIDE, SODIUM LACTATE, POTASSIUM CHLORIDE, AND CALCIUM CHLORIDE .6; .31; .03; .02 G/100ML; G/100ML; G/100ML; G/100ML
1000 INJECTION, SOLUTION INTRAVENOUS ONCE
Status: COMPLETED | OUTPATIENT
Start: 2025-07-05 | End: 2025-07-05

## 2025-07-05 RX ADMIN — LEVETIRACETAM 2000 MG: 500 TABLET, FILM COATED ORAL at 07:37

## 2025-07-05 RX ADMIN — LOPERAMIDE HYDROCHLORIDE 2 MG: 2 CAPSULE ORAL at 05:53

## 2025-07-05 RX ADMIN — ALUMINUM HYDROXIDE, MAGNESIUM HYDROXIDE, AND SIMETHICONE 30 ML: 200; 200; 20 SUSPENSION ORAL at 00:02

## 2025-07-05 RX ADMIN — IBUPROFEN 400 MG: 400 TABLET ORAL at 21:23

## 2025-07-05 RX ADMIN — FENOFIBRATE 54 MG: 54 TABLET ORAL at 07:40

## 2025-07-05 RX ADMIN — LACOSAMIDE 100 MG: 100 TABLET, FILM COATED ORAL at 21:19

## 2025-07-05 RX ADMIN — ACETAMINOPHEN 650 MG: 325 TABLET ORAL at 07:38

## 2025-07-05 RX ADMIN — METFORMIN HYDROCHLORIDE 1000 MG: 1000 TABLET ORAL at 17:07

## 2025-07-05 RX ADMIN — METFORMIN HYDROCHLORIDE 1000 MG: 1000 TABLET ORAL at 07:37

## 2025-07-05 RX ADMIN — LEVETIRACETAM 2000 MG: 500 TABLET, FILM COATED ORAL at 21:19

## 2025-07-05 RX ADMIN — OMEGA-3-ACID ETHYL ESTERS CAPSULES 1 CAPSULE: 1 CAPSULE, LIQUID FILLED ORAL at 07:38

## 2025-07-05 RX ADMIN — MIRTAZAPINE 15 MG: 15 TABLET, FILM COATED ORAL at 21:19

## 2025-07-05 RX ADMIN — SODIUM CHLORIDE, POTASSIUM CHLORIDE, SODIUM LACTATE AND CALCIUM CHLORIDE 1000 ML: 600; 310; 30; 20 INJECTION, SOLUTION INTRAVENOUS at 06:17

## 2025-07-05 RX ADMIN — LOPERAMIDE HYDROCHLORIDE 2 MG: 2 CAPSULE ORAL at 17:07

## 2025-07-05 RX ADMIN — ATORVASTATIN CALCIUM 80 MG: 20 TABLET, FILM COATED ORAL at 21:19

## 2025-07-05 RX ADMIN — LACOSAMIDE 100 MG: 100 TABLET, FILM COATED ORAL at 07:38

## 2025-07-05 ASSESSMENT — PAIN SCALES - GENERAL
PAINLEVEL_OUTOF10: 6
PAINLEVEL_OUTOF10: 6
PAINLEVEL_OUTOF10: 4
PAINLEVEL_OUTOF10: 2

## 2025-07-05 ASSESSMENT — PAIN DESCRIPTION - LOCATION
LOCATION: BACK
LOCATION: BACK;FLANK;BUTTOCKS

## 2025-07-05 ASSESSMENT — PAIN - FUNCTIONAL ASSESSMENT
PAIN_FUNCTIONAL_ASSESSMENT: PREVENTS OR INTERFERES SOME ACTIVE ACTIVITIES AND ADLS
PAIN_FUNCTIONAL_ASSESSMENT: ACTIVITIES ARE NOT PREVENTED

## 2025-07-05 ASSESSMENT — PAIN DESCRIPTION - DESCRIPTORS
DESCRIPTORS: SHARP
DESCRIPTORS: ACHING;SHOOTING;THROBBING

## 2025-07-05 ASSESSMENT — PAIN DESCRIPTION - ORIENTATION
ORIENTATION: RIGHT
ORIENTATION: RIGHT;LOWER

## 2025-07-05 NOTE — GROUP NOTE
Group Therapy Note    Date: 7/5/2025    Group Start Time: 0800  Group End Time: 0810  Group Topic: Orientation Group    [facility]    Rosalinda Iniguez        Group Therapy Note    Attendees: 8/14     Morning Orientation Group Note        Date: July 5, 2025 Start Time: 0800  End Time: 0810      Number of Participants in Group & Unit Census:  8/14    Topic: Morning Orientation    Goal of Group: Review of staff, rules and schedule      Comments:     Patient did not participate in Morning Orientation group, despite staff encouragement and explanation of benefits.  Patient remain seclusive to self.  Q15 minute safety checks maintained for patient safety and will continue to encourage patient to attend unit programming.

## 2025-07-05 NOTE — GROUP NOTE
Group Therapy Note    Date: 7/5/2025    Group Start Time: 1400  Group End Time: 1500  Group Topic: Cognitive Skills    [facility]    Giovanna Quach CTRS        Group Therapy Note    Attendees: 2/13     Topic: To increase social interaction, practice self expression, and explore stress management   using the senses to utilize outside of hospital, through creative expression and discussion.           Comments:   Patient did not participate in Cognitive Skills Group at 14:00, despite staff encouragement and explanation   of benefits. Pt was pleasant on approach but c/o pain and stiffness after a fall , pt was reading in bed. RT offered comfort easures, individual activities for pt, pt was appreciative and stated still had some that RT had provided and had a new book.     Q15 minute safety checks maintained for patient safety and will continue to encourage patient to   attend unit programming.         Discipline Responsible: Psychoeducational Specialist   Signature: REMY GIL

## 2025-07-05 NOTE — GROUP NOTE
Group Therapy Note    Date: 7/5/2025    Group Start Time: 0900  Group End Time: 0920  Group Topic: Community Meeting    [facility]    Rosalinda Iniguez        Group Therapy Note    Attendees: 3/14     Community Meeting Group Note        Date: July 5, 2025 Start Time: 9am  End Time: 0920      Number of Participants in Group & Unit Census:  3/14    Topic: goal setting    Goal of Group: set short term goal for the day      Comments:     Patient did not participate in Community Meeting group, despite staff encouragement and explanation of benefits.  Patient remain seclusive to self.  Q15 minute safety checks maintained for patient safety and will continue to encourage patient to attend unit programming.

## 2025-07-06 ENCOUNTER — APPOINTMENT (OUTPATIENT)
Dept: GENERAL RADIOLOGY | Age: 50
DRG: 751 | End: 2025-07-06
Payer: MEDICAID

## 2025-07-06 LAB
ANION GAP SERPL CALCULATED.3IONS-SCNC: 9 MMOL/L (ref 9–16)
BUN SERPL-MCNC: 21 MG/DL (ref 6–20)
CALCIUM SERPL-MCNC: 8.3 MG/DL (ref 8.6–10.4)
CHLORIDE SERPL-SCNC: 108 MMOL/L (ref 98–107)
CO2 SERPL-SCNC: 23 MMOL/L (ref 20–31)
CREAT SERPL-MCNC: 0.8 MG/DL (ref 0.7–1.2)
GFR, ESTIMATED: >90 ML/MIN/1.73M2
GLUCOSE SERPL-MCNC: 172 MG/DL (ref 74–99)
POTASSIUM SERPL-SCNC: 4.2 MMOL/L (ref 3.7–5.3)
SODIUM SERPL-SCNC: 140 MMOL/L (ref 136–145)

## 2025-07-06 PROCEDURE — 6370000000 HC RX 637 (ALT 250 FOR IP): Performed by: PSYCHIATRY & NEUROLOGY

## 2025-07-06 PROCEDURE — 6370000000 HC RX 637 (ALT 250 FOR IP): Performed by: INTERNAL MEDICINE

## 2025-07-06 PROCEDURE — 80048 BASIC METABOLIC PNL TOTAL CA: CPT

## 2025-07-06 PROCEDURE — 6370000000 HC RX 637 (ALT 250 FOR IP): Performed by: NURSE PRACTITIONER

## 2025-07-06 PROCEDURE — 36415 COLL VENOUS BLD VENIPUNCTURE: CPT

## 2025-07-06 PROCEDURE — 74018 RADEX ABDOMEN 1 VIEW: CPT

## 2025-07-06 PROCEDURE — 99232 SBSQ HOSP IP/OBS MODERATE 35: CPT | Performed by: PSYCHIATRY & NEUROLOGY

## 2025-07-06 PROCEDURE — 99232 SBSQ HOSP IP/OBS MODERATE 35: CPT | Performed by: INTERNAL MEDICINE

## 2025-07-06 PROCEDURE — 1240000000 HC EMOTIONAL WELLNESS R&B

## 2025-07-06 RX ORDER — CYCLOBENZAPRINE HCL 10 MG
10 TABLET ORAL 3 TIMES DAILY PRN
Status: DISCONTINUED | OUTPATIENT
Start: 2025-07-06 | End: 2025-07-09 | Stop reason: HOSPADM

## 2025-07-06 RX ADMIN — ATORVASTATIN CALCIUM 80 MG: 20 TABLET, FILM COATED ORAL at 20:29

## 2025-07-06 RX ADMIN — FENOFIBRATE 54 MG: 54 TABLET ORAL at 09:13

## 2025-07-06 RX ADMIN — METFORMIN HYDROCHLORIDE 1000 MG: 1000 TABLET ORAL at 17:08

## 2025-07-06 RX ADMIN — LACOSAMIDE 100 MG: 100 TABLET, FILM COATED ORAL at 09:13

## 2025-07-06 RX ADMIN — METFORMIN HYDROCHLORIDE 1000 MG: 1000 TABLET ORAL at 09:13

## 2025-07-06 RX ADMIN — LACOSAMIDE 100 MG: 100 TABLET, FILM COATED ORAL at 20:29

## 2025-07-06 RX ADMIN — MIRTAZAPINE 15 MG: 15 TABLET, FILM COATED ORAL at 20:29

## 2025-07-06 RX ADMIN — OMEGA-3-ACID ETHYL ESTERS CAPSULES 1 CAPSULE: 1 CAPSULE, LIQUID FILLED ORAL at 09:12

## 2025-07-06 RX ADMIN — IBUPROFEN 400 MG: 400 TABLET ORAL at 22:02

## 2025-07-06 RX ADMIN — LEVETIRACETAM 2000 MG: 500 TABLET, FILM COATED ORAL at 09:12

## 2025-07-06 RX ADMIN — CYCLOBENZAPRINE 10 MG: 10 TABLET, FILM COATED ORAL at 20:30

## 2025-07-06 RX ADMIN — LEVETIRACETAM 2000 MG: 500 TABLET, FILM COATED ORAL at 20:30

## 2025-07-06 ASSESSMENT — PAIN SCALES - GENERAL
PAINLEVEL_OUTOF10: 7
PAINLEVEL_OUTOF10: 5

## 2025-07-06 ASSESSMENT — PAIN DESCRIPTION - ORIENTATION: ORIENTATION: RIGHT;LOWER

## 2025-07-06 ASSESSMENT — PAIN DESCRIPTION - LOCATION
LOCATION: BUTTOCKS;BACK
LOCATION: BACK;BUTTOCKS;FLANK

## 2025-07-06 ASSESSMENT — PAIN DESCRIPTION - DESCRIPTORS: DESCRIPTORS: ACHING;THROBBING

## 2025-07-06 ASSESSMENT — LIFESTYLE VARIABLES
HOW MANY STANDARD DRINKS CONTAINING ALCOHOL DO YOU HAVE ON A TYPICAL DAY: PATIENT DOES NOT DRINK
HOW OFTEN DO YOU HAVE A DRINK CONTAINING ALCOHOL: NEVER

## 2025-07-06 ASSESSMENT — PAIN DESCRIPTION - PAIN TYPE: TYPE: ACUTE PAIN

## 2025-07-06 ASSESSMENT — PAIN - FUNCTIONAL ASSESSMENT: PAIN_FUNCTIONAL_ASSESSMENT: ACTIVITIES ARE NOT PREVENTED

## 2025-07-06 NOTE — GROUP NOTE
Group Therapy Note    Date: 7/6/2025    Group Start Time: 0900  Group End Time: 0921  Group Topic: Community Meeting    [facility]    Rosalinda Iniguez        Group Therapy Note    Attendees: 5/20     Community Meeting Group Note        Date: July 6, 2025 Start Time: 9am  End Time: 0921      Number of Participants in Group & Unit Census:  5/20    Topic: Goal setting    Goal of Group: Set short term goal for the day      Comments:     Patient did not participate in Community Meeting group, despite staff encouragement and explanation of benefits.  Patient remain seclusive to self.  Q15 minute safety checks maintained for patient safety and will continue to encourage patient to attend unit programming.

## 2025-07-07 LAB
ANION GAP SERPL CALCULATED.3IONS-SCNC: 11 MMOL/L (ref 9–16)
BASOPHILS # BLD: 0.04 K/UL (ref 0–0.2)
BASOPHILS NFR BLD: 1 % (ref 0–2)
BUN SERPL-MCNC: 21 MG/DL (ref 6–20)
CALCIUM SERPL-MCNC: 9.1 MG/DL (ref 8.6–10.4)
CHLORIDE SERPL-SCNC: 107 MMOL/L (ref 98–107)
CO2 SERPL-SCNC: 24 MMOL/L (ref 20–31)
CREAT SERPL-MCNC: 0.6 MG/DL (ref 0.7–1.2)
EOSINOPHIL # BLD: 0.25 K/UL (ref 0–0.44)
EOSINOPHILS RELATIVE PERCENT: 3 % (ref 0–4)
ERYTHROCYTE [DISTWIDTH] IN BLOOD BY AUTOMATED COUNT: 13.2 % (ref 11.5–14.9)
GFR, ESTIMATED: >90 ML/MIN/1.73M2
GLUCOSE SERPL-MCNC: 155 MG/DL (ref 74–99)
HCT VFR BLD AUTO: 38.5 % (ref 41–53)
HGB BLD-MCNC: 12.1 G/DL (ref 13.5–17.5)
IMM GRANULOCYTES # BLD AUTO: 0.05 K/UL (ref 0–0.3)
IMM GRANULOCYTES NFR BLD: 1 %
LYMPHOCYTES NFR BLD: 2.5 K/UL (ref 1.1–3.7)
LYMPHOCYTES RELATIVE PERCENT: 32 % (ref 24–44)
MCH RBC QN AUTO: 27.4 PG (ref 26–34)
MCHC RBC AUTO-ENTMCNC: 31.4 G/DL (ref 31–37)
MCV RBC AUTO: 87.1 FL (ref 80–100)
MONOCYTES NFR BLD: 0.7 K/UL (ref 0.1–1.2)
MONOCYTES NFR BLD: 9 % (ref 3–12)
NEUTROPHILS NFR BLD: 54 % (ref 36–66)
NEUTS SEG NFR BLD: 4.23 K/UL (ref 1.5–8.1)
NRBC BLD-RTO: 0 PER 100 WBC
PLATELET # BLD AUTO: 273 K/UL (ref 150–450)
PMV BLD AUTO: 10.5 FL (ref 8–13.5)
POTASSIUM SERPL-SCNC: 4.4 MMOL/L (ref 3.7–5.3)
RBC # BLD AUTO: 4.42 M/UL (ref 4.21–5.77)
SODIUM SERPL-SCNC: 142 MMOL/L (ref 136–145)
WBC OTHER # BLD: 7.8 K/UL (ref 3.5–11)

## 2025-07-07 PROCEDURE — 80048 BASIC METABOLIC PNL TOTAL CA: CPT

## 2025-07-07 PROCEDURE — 6370000000 HC RX 637 (ALT 250 FOR IP): Performed by: PSYCHIATRY & NEUROLOGY

## 2025-07-07 PROCEDURE — 99232 SBSQ HOSP IP/OBS MODERATE 35: CPT | Performed by: PSYCHIATRY & NEUROLOGY

## 2025-07-07 PROCEDURE — 85025 COMPLETE CBC W/AUTO DIFF WBC: CPT

## 2025-07-07 PROCEDURE — 6370000000 HC RX 637 (ALT 250 FOR IP): Performed by: NURSE PRACTITIONER

## 2025-07-07 PROCEDURE — 1240000000 HC EMOTIONAL WELLNESS R&B

## 2025-07-07 PROCEDURE — 36415 COLL VENOUS BLD VENIPUNCTURE: CPT

## 2025-07-07 PROCEDURE — 6370000000 HC RX 637 (ALT 250 FOR IP): Performed by: INTERNAL MEDICINE

## 2025-07-07 RX ADMIN — LEVETIRACETAM 2000 MG: 500 TABLET, FILM COATED ORAL at 20:36

## 2025-07-07 RX ADMIN — ATORVASTATIN CALCIUM 80 MG: 20 TABLET, FILM COATED ORAL at 20:37

## 2025-07-07 RX ADMIN — OMEGA-3-ACID ETHYL ESTERS CAPSULES 1 CAPSULE: 1 CAPSULE, LIQUID FILLED ORAL at 08:30

## 2025-07-07 RX ADMIN — LACOSAMIDE 100 MG: 100 TABLET, FILM COATED ORAL at 20:38

## 2025-07-07 RX ADMIN — FENOFIBRATE 54 MG: 54 TABLET ORAL at 08:31

## 2025-07-07 RX ADMIN — MIRTAZAPINE 15 MG: 15 TABLET, FILM COATED ORAL at 20:37

## 2025-07-07 RX ADMIN — LACOSAMIDE 100 MG: 100 TABLET, FILM COATED ORAL at 08:27

## 2025-07-07 RX ADMIN — ACETAMINOPHEN 650 MG: 325 TABLET ORAL at 20:37

## 2025-07-07 RX ADMIN — LEVETIRACETAM 2000 MG: 500 TABLET, FILM COATED ORAL at 08:28

## 2025-07-07 RX ADMIN — METFORMIN HYDROCHLORIDE 1000 MG: 1000 TABLET ORAL at 17:05

## 2025-07-07 RX ADMIN — METFORMIN HYDROCHLORIDE 1000 MG: 1000 TABLET ORAL at 08:28

## 2025-07-07 ASSESSMENT — PAIN SCALES - GENERAL: PAINLEVEL_OUTOF10: 4

## 2025-07-07 NOTE — GROUP NOTE
Group Therapy Note    Date: 7/7/2025    Group Start Time: 1000  Group End Time: 1030  Group Topic: Psychoeducation    [facility]    Andrea [facility]ain        Group Therapy Note    Attendees: 5/23     Patient was offered group therapy today but declined to participate despite encouragement from staff. 1:1 was offered.  Signature:  Andrea Mcclain

## 2025-07-07 NOTE — GROUP NOTE
Group Therapy Note    Date: 7/7/2025    Group Start Time: 1330  Group End Time: 1415  Group Topic: Music Therapy    [facility]    René Nix    Music Therapy Group Note        Date: 7/7/2025   Start Time: 1330  End Time: 1415      Number of Participants in Group & Unit Census:  6/21    Topic: Patients analyzed lyrics and themes in preferred music and shared advice based on their song selections. Engaged in brief reflections on cognitive distortion Black and White thinking.     Goal of Group: Goals to engage in peer supportive; Increase self-esteem; Increase self-expression; Demonstrate positive use of time;       Comments:     Patient did not participate in Music Therapy group, despite staff encouragement and explanation of benefits.  Patient remain seclusive to self.  Q15 minute safety checks maintained for patient safety and will continue to encourage patient to attend unit programming.

## 2025-07-08 PROCEDURE — 99232 SBSQ HOSP IP/OBS MODERATE 35: CPT | Performed by: PSYCHIATRY & NEUROLOGY

## 2025-07-08 PROCEDURE — 6370000000 HC RX 637 (ALT 250 FOR IP): Performed by: NURSE PRACTITIONER

## 2025-07-08 PROCEDURE — 6370000000 HC RX 637 (ALT 250 FOR IP): Performed by: INTERNAL MEDICINE

## 2025-07-08 PROCEDURE — 1240000000 HC EMOTIONAL WELLNESS R&B

## 2025-07-08 PROCEDURE — 6370000000 HC RX 637 (ALT 250 FOR IP): Performed by: PSYCHIATRY & NEUROLOGY

## 2025-07-08 RX ADMIN — LACOSAMIDE 100 MG: 100 TABLET, FILM COATED ORAL at 08:41

## 2025-07-08 RX ADMIN — MIRTAZAPINE 15 MG: 15 TABLET, FILM COATED ORAL at 20:30

## 2025-07-08 RX ADMIN — LEVETIRACETAM 2000 MG: 500 TABLET, FILM COATED ORAL at 08:41

## 2025-07-08 RX ADMIN — FENOFIBRATE 54 MG: 54 TABLET ORAL at 08:43

## 2025-07-08 RX ADMIN — LACOSAMIDE 100 MG: 100 TABLET, FILM COATED ORAL at 20:31

## 2025-07-08 RX ADMIN — CYCLOBENZAPRINE 10 MG: 10 TABLET, FILM COATED ORAL at 20:29

## 2025-07-08 RX ADMIN — METFORMIN HYDROCHLORIDE 1000 MG: 1000 TABLET ORAL at 17:52

## 2025-07-08 RX ADMIN — LEVETIRACETAM 2000 MG: 500 TABLET, FILM COATED ORAL at 20:30

## 2025-07-08 RX ADMIN — OMEGA-3-ACID ETHYL ESTERS CAPSULES 1 CAPSULE: 1 CAPSULE, LIQUID FILLED ORAL at 08:43

## 2025-07-08 RX ADMIN — ATORVASTATIN CALCIUM 80 MG: 20 TABLET, FILM COATED ORAL at 20:29

## 2025-07-08 RX ADMIN — METFORMIN HYDROCHLORIDE 1000 MG: 1000 TABLET ORAL at 08:41

## 2025-07-08 RX ADMIN — IBUPROFEN 400 MG: 400 TABLET ORAL at 20:29

## 2025-07-08 ASSESSMENT — LIFESTYLE VARIABLES
HOW OFTEN DO YOU HAVE A DRINK CONTAINING ALCOHOL: NEVER
HOW MANY STANDARD DRINKS CONTAINING ALCOHOL DO YOU HAVE ON A TYPICAL DAY: PATIENT DOES NOT DRINK
HOW OFTEN DO YOU HAVE A DRINK CONTAINING ALCOHOL: NEVER
HOW MANY STANDARD DRINKS CONTAINING ALCOHOL DO YOU HAVE ON A TYPICAL DAY: PATIENT DOES NOT DRINK

## 2025-07-08 ASSESSMENT — PAIN SCALES - GENERAL: PAINLEVEL_OUTOF10: 6

## 2025-07-08 ASSESSMENT — PAIN DESCRIPTION - LOCATION: LOCATION: BACK;BUTTOCKS

## 2025-07-08 NOTE — GROUP NOTE
Group Therapy Note    Date: 7/8/2025    Group Start Time: 0900  Group End Time: 0915  Group Topic: Group Documentation    [facility]    Kourtney Pina LPN        Group Therapy Note         Patient did not participate in Goals  group, despite staff encouragement and explanation of benefits.  Patient remain seclusive to self.  Q15 minute safety checks maintained for patient safety and will continue to encourage patient to attend unit programming.      Signature:  Kourtney Pina LPN

## 2025-07-08 NOTE — GROUP NOTE
Group Therapy Note    Date: 7/8/2025    Group Start Time: 1110  Group End Time: 1200  Group Topic: Cognitive Skills    [facility]    Giovanna Quach CTRS        Group Therapy Note    Attendees: 9/22     Topic: To increase social interaction , practice decision making and communication skills.           Comments:   Patient did not participate in Cognitive Skills Group at 11:10, despite staff encouragement and explanation   of benefits. Pt was seclusive to self , pt chose to read in group area. Pt is pleasant and talkative on approach by RT.  Pt is somewhat preoccupied with waiting to talk with his Provider today.     Q15 minute safety checks maintained for patient safety and will continue to encourage patient to   attend unit programming.         Discipline Responsible: Psychoeducational Specialist   Signature: REMY GIL

## 2025-07-08 NOTE — GROUP NOTE
Group Therapy Note    Date: 7/8/2025    Group Start Time: 1015  Group End Time: 1945  [facility] T[facility]: Psychoeducation    [facility]    Andrea [facility]clain        Group Therapy Note    Attendees: 6/22      Patient was offered group therapy today but declined to participate despite encouragement from staff. 1:1 was offered.    Signature:  Andrea Mcclain

## 2025-07-09 VITALS
OXYGEN SATURATION: 94 % | DIASTOLIC BLOOD PRESSURE: 84 MMHG | RESPIRATION RATE: 16 BRPM | TEMPERATURE: 97.2 F | WEIGHT: 188 LBS | HEIGHT: 66 IN | SYSTOLIC BLOOD PRESSURE: 135 MMHG | BODY MASS INDEX: 30.22 KG/M2 | HEART RATE: 83 BPM

## 2025-07-09 PROCEDURE — 6370000000 HC RX 637 (ALT 250 FOR IP): Performed by: PSYCHIATRY & NEUROLOGY

## 2025-07-09 PROCEDURE — 6370000000 HC RX 637 (ALT 250 FOR IP): Performed by: NURSE PRACTITIONER

## 2025-07-09 PROCEDURE — 6370000000 HC RX 637 (ALT 250 FOR IP): Performed by: INTERNAL MEDICINE

## 2025-07-09 RX ORDER — ATORVASTATIN CALCIUM 80 MG/1
80 TABLET, FILM COATED ORAL NIGHTLY
Qty: 30 TABLET | Refills: 3 | Status: SHIPPED | OUTPATIENT
Start: 2025-07-09

## 2025-07-09 RX ORDER — MIRTAZAPINE 15 MG/1
15 TABLET, FILM COATED ORAL NIGHTLY
Qty: 30 TABLET | Refills: 3 | Status: SHIPPED | OUTPATIENT
Start: 2025-07-09

## 2025-07-09 RX ORDER — FENOFIBRATE 54 MG/1
54 TABLET ORAL DAILY
Qty: 30 TABLET | Refills: 3 | Status: SHIPPED | OUTPATIENT
Start: 2025-07-10

## 2025-07-09 RX ADMIN — METFORMIN HYDROCHLORIDE 1000 MG: 1000 TABLET ORAL at 08:17

## 2025-07-09 RX ADMIN — LACOSAMIDE 100 MG: 100 TABLET, FILM COATED ORAL at 08:17

## 2025-07-09 RX ADMIN — OMEGA-3-ACID ETHYL ESTERS CAPSULES 1 CAPSULE: 1 CAPSULE, LIQUID FILLED ORAL at 08:18

## 2025-07-09 RX ADMIN — LEVETIRACETAM 2000 MG: 500 TABLET, FILM COATED ORAL at 08:17

## 2025-07-09 RX ADMIN — FENOFIBRATE 54 MG: 54 TABLET ORAL at 08:18

## 2025-07-09 NOTE — PLAN OF CARE
Problem: Chronic Conditions and Co-morbidities  Goal: Patient's chronic conditions and co-morbidity symptoms are monitored and maintained or improved  7/7/2025 2303 by Renu Parmar RN  Outcome: Progressing  Note: Patient educated on diet and non-pharmalogical interventions that can assist in improving current chronic co-morbidities. Patient provided with access to medication and meals appropriate in assisting with co-morbidities. Patient agreed to inform staff if further assistance and/or education is needed. Staff ensures safety by providing safety checks on the unit intermittently and every 15 minutes. Staff continues to provide a safe environment. Patient educated on diet and non-pharmalogical interventions that can assist in improving current chronic co-morbidities. Patient provided with access to medication and meals appropriate in assisting with co-morbidities. Patient agreed to inform staff if further assistance and/or education is needed. Staff ensures safety by providing safety checks on the unit intermittently and every 15 minutes. Staff continues to provide a safe environment.      Problem: Self Harm/Suicidality  Goal: Will have no self-injury during hospital stay  Description: INTERVENTIONS:  1.  Ensure constant observer at bedside with Q15M safety checks  2.  Maintain a safe environment  3.  Secure patient belongings  4.  Ensure family/visitors adhere to safety recommendations  5.  Ensure safety tray has been added to patient's diet order  6.  Every shift and PRN: Re-assess suicidal risk via Frequent Screener    7/7/2025 2303 by Renu Parmar, RN  Outcome: Progressing  Note: Patient remains free from harm to self or others but admits to continued anxiety and depression. Medications available upon request. Staff ensures safety by providing safety checks on the unit intermittently and every 15 minutes. Staff continues to provide a safe environment. Staff encouraged patient to notify if 
  Problem: Depression  Goal: Will be euthymic at discharge  Description: INTERVENTIONS:  1. Administer medication as ordered  2. Provide emotional support via 1:1 interaction with staff  3. Encourage involvement in milieu/groups/activities  4. Monitor for social isolation  6/30/2025 2124 by Analia Kim  Outcome: Progressing  Note: denies SI but states he has been sad and hopeless today, crying at times earlier. States wife called and \"made it worse.\" Flat affect, depressed, Cooperative and friendly. Compliant with all prescribed medications for this shift except refused insulin. Safety checks maintained q15min and irregular rounding.      Problem: Self Harm/Suicidality  Goal: Will have no self-injury during hospital stay  Description: INTERVENTIONS:  1.  Ensure constant observer at bedside with Q15M safety checks  2.  Maintain a safe environment  3.  Secure patient belongings  4.  Ensure family/visitors adhere to safety recommendations  5.  Ensure safety tray has been added to patient's diet order  6.  Every shift and PRN: Re-assess suicidal risk via Frequent Screener    6/30/2025 2124 by Analia Kim  Outcome: Progressing  Note: Remains free from self harm at this time. Pt denies thoughts of self harm and is agreeable to seeking out should thoughts of self harm arise.  Safe environment maintained.  Q15 minute checks for safety cont per unit policy.  Will cont to monitor for safety and provides support and reassurance as needed.       
  Problem: Depression  Goal: Will be euthymic at discharge  Description: INTERVENTIONS:  1. Administer medication as ordered  2. Provide emotional support via 1:1 interaction with staff  3. Encourage involvement in milieu/groups/activities  4. Monitor for social isolation  Outcome: Progressing   1:1 with pt x ten minutes.  Pt encouraged to attend unit programming and interact with peers and staff.  Pt also encouraged to tend to hygiene and ADLs.  Pt encouraged to discuss feelings with staff and feedback and reassurance provided.    Pt denies thoughts of self harm and is agreeable to seeking out should thoughts of self harm arise.  Safe environment maintained.  Q15 minute checks for safety cont per unit policy.  Will cont to monitor for safety and provides support and reassurance as needed.    Pt is compliant with medications. Hopeless et helpless during talk times. Refusing groups et is seclusive to room for long intervals.   
  Problem: Pain  Goal: Verbalizes/displays adequate comfort level or baseline comfort level  7/2/2025 0016 by Brenda Ernst LPN  Outcome: Progressing  Flowsheets (Taken 7/2/2025 0016)  Verbalizes/displays adequate comfort level or baseline comfort level:   Notify Licensed Independent Practitioner if interventions unsuccessful or patient reports new pain   Consider cultural and social influences on pain and pain management   Implement non-pharmacological measures as appropriate and evaluate response   Administer analgesics based on type and severity of pain and evaluate response   Assess pain using appropriate pain scale   Encourage patient to monitor pain and request assistance  Note: Patient remains free from pain throughout the shift. Staff encouraged patient to notify nursing if pain arises.      Problem: Depression  Goal: Will be euthymic at discharge  Description: INTERVENTIONS:  1. Administer medication as ordered  2. Provide emotional support via 1:1 interaction with staff  3. Encourage involvement in milieu/groups/activities  4. Monitor for social isolation  7/2/2025 0016 by Brenda Ernst LPN  Outcome: Not Progressing  Note: Patient remains free from harm to self or others but admits to continued anxiety and depression. Medications available upon request. Staff ensures safety by providing safety checks on the unit intermittently and every 15 minutes. Staff continues to provide a safe environment. Staff encouraged patient to notify if depression continues.   7/1/2025 1156 by Gwendolyn Yo RN  Outcome: Progressing     
  Problem: Seizure Precautions  Goal: Remains free of injury related to seizures activity  Outcome: Progressing     Problem: Nutrition Deficit:  Goal: Optimize nutritional status  Outcome: Progressing     Problem: Self Harm/Suicidality  Goal: Will have no self-injury during hospital stay  Description: INTERVENTIONS:  1.  Ensure constant observer at bedside with Q15M safety checks  2.  Maintain a safe environment  3.  Secure patient belongings  4.  Ensure family/visitors adhere to safety recommendations  5.  Ensure safety tray has been added to patient's diet order  6.  Every shift and PRN: Re-assess suicidal risk via Frequent Screener    Outcome: Progressing   Patient alert and oriented X 4.  Patient denies thoughts to harm self or others at this time.  Patient free from falls and injuries at this time no seizure activity noted at this time.  Patient nutritional status is optimal as evidence by patient ate 100% of meals a this time.  No other signs and symptoms of distress observe.  
  Problem: Seizure Precautions  Goal: Remains free of injury related to seizures activity  Outcome: Progressing     Problem: Self Harm/Suicidality  Goal: Will have no self-injury during hospital stay  Description: INTERVENTIONS:  1.  Ensure constant observer at bedside with Q15M safety checks  2.  Maintain a safe environment  3.  Secure patient belongings  4.  Ensure family/visitors adhere to safety recommendations  5.  Ensure safety tray has been added to patient's diet order  6.  Every shift and PRN: Re-assess suicidal risk via Frequent Screener    Outcome: Progressing  Flowsheets (Taken 7/5/2025 1253)  Will have no self-injury during hospital stay: Maintain a safe environment   Patient denies thoughts of self harm.  Problem: Chronic Conditions and Co-morbidities  Goal: Patient's chronic conditions and co-morbidity symptoms are monitored and maintained or improved  Outcome: Progressing   Vitals stable this shift  
  Problem: Seizure Precautions  Goal: Remains free of injury related to seizures activity  Outcome: Progressing  Note: Pt has remained free of injury related to seizure activity, LOS for safety continues, q 15 min safety checks maintained.     Problem: Self Harm/Suicidality  Goal: Will have no self-injury during hospital stay  Description: INTERVENTIONS:  1.  Ensure constant observer at bedside with Q15M safety checks  2.  Maintain a safe environment  3.  Secure patient belongings  4.  Ensure family/visitors adhere to safety recommendations  5.  Ensure safety tray has been added to patient's diet order  6.  Every shift and PRN: Re-assess suicidal risk via Frequent Screener    7/4/2025 1945 by Grace Madsen, RN  Outcome: Progressing  Note: Pt denies suicidal thoughts, remains free from self-injury, q 15 min safety checks maintained.     
  Problem: Self Harm/Suicidality  Goal: Will have no self-injury during hospital stay  Description: INTERVENTIONS:  1.  Ensure constant observer at bedside with Q15M safety checks  2.  Maintain a safe environment  3.  Secure patient belongings  4.  Ensure family/visitors adhere to safety recommendations  5.  Ensure safety tray has been added to patient's diet order  6.  Every shift and PRN: Re-assess suicidal risk via Frequent Screener    7/2/2025 1434 by Tamika Ramirez LPN  Outcome: Progressing   Patient reports fleeting suicidal thoughts, agrees to be safe and reach out to staff if feelings become overwhelming  Problem: Depression  Goal: Will be euthymic at discharge  Description: INTERVENTIONS:  1. Administer medication as ordered  2. Provide emotional support via 1:1 interaction with staff  3. Encourage involvement in milieu/groups/activities  4. Monitor for social isolation  7/2/2025 1434 by Tamika Ramirez LPN  Outcome: Progressing   Mr. Olmos is seen in his room affect is flat he is isolative to room, taking medications. He is encouraged to attend groups. Reports depression and anxiety, states family issues as part of the problem. He denies any issues with sleep or appetite. Takes medications as ordered.   Problem: Pain  Goal: Verbalizes/displays adequate comfort level or baseline comfort level  7/2/2025 1434 by Tamika Ramirez LPN  Outcome: Progressing   Denies any pain  
  Problem: Self Harm/Suicidality  Goal: Will have no self-injury during hospital stay  Description: INTERVENTIONS:  1.  Ensure constant observer at bedside with Q15M safety checks  2.  Maintain a safe environment  3.  Secure patient belongings  4.  Ensure family/visitors adhere to safety recommendations  5.  Ensure safety tray has been added to patient's diet order  6.  Every shift and PRN: Re-assess suicidal risk via Frequent Screener    7/2/2025 2305 by Renu Parmar, RN  Outcome: Progressing  Note: Patient remains free from harm to self or others but admits to continued anxiety and depression. Medications administered as ordered. Staff ensures safety by providing safety checks on the unit intermittently and every 15 minutes. Staff continues to provide a safe environment. Staff encouraged patient to notify if depression continues.Patient remains free from harm to self or others but admits to continued anxiety and depression. Medications available upon request. Staff ensures safety by providing safety checks on the unit intermittently and every 15 minutes. Staff continues to provide a safe environment. Staff encouraged patient to notify if depression continues.     Problem: Depression  Goal: Will be euthymic at discharge  Description: INTERVENTIONS:  1. Administer medication as ordered  2. Provide emotional support via 1:1 interaction with staff  3. Encourage involvement in milieu/groups/activities  4. Monitor for social isolation  7/2/2025 2305 by Renu Parmar, RN  Outcome: Progressing     
  Problem: Self Harm/Suicidality  Goal: Will have no self-injury during hospital stay  Description: INTERVENTIONS:  1.  Ensure constant observer at bedside with Q15M safety checks  2.  Maintain a safe environment  3.  Secure patient belongings  4.  Ensure family/visitors adhere to safety recommendations  5.  Ensure safety tray has been added to patient's diet order  6.  Every shift and PRN: Re-assess suicidal risk via Frequent Screener    7/4/2025 1119 by Shanna Martin LPN  Outcome: Progressing  Flowsheets (Taken 7/4/2025 1119)  Will have no self-injury during hospital stay: Maintain a safe environment  Note: Patient is flat, guarded during 1:1, patient reports feeling helpless hopeless, lacks energy and motivation into daily activities, feeling worthless.  Patient denies having thoughts of self harm, or plans of harming self. Patient instructed and encouraged to seek help when needed, patient verbalizes and agrees to seek help when needed. Instructed and encouraged program participating and treatment planning. Visual checks maintained.      
  Problem: Self Harm/Suicidality  Goal: Will have no self-injury during hospital stay  Description: INTERVENTIONS:  1.  Ensure constant observer at bedside with Q15M safety checks  2.  Maintain a safe environment  3.  Secure patient belongings  4.  Ensure family/visitors adhere to safety recommendations  5.  Ensure safety tray has been added to patient's diet order  6.  Every shift and PRN: Re-assess suicidal risk via Frequent Screener    7/5/2025 2030 by Cynthia Brannon, RN  Outcome: Progressing  Flowsheets (Taken 7/5/2025 2029)  Will have no self-injury during hospital stay:   Maintain a safe environment   Every shift and PRN: Re-assess suicidal risk via Frequent Screener  Note: Patient continues with fleeting suicidal thoughts but contracts for safety while on unit. Pt denies homicidal ideations, denies any hallucinations.  Pt agreed to seek staff at anytime if unable to control the urge to hurt self or others. Pt remains free from any self-harm, safe environment maintained. Regular rounding every 15 minutes and random patient checks conducted for safety as per unit policy.       Problem: Seizure Precautions  Goal: Remains free of injury related to seizures activity  7/5/2025 2030 by Cynthia Brannon, RN  Outcome: Progressing  Flowsheets (Taken 7/5/2025 2030)  Remains free of injury related to seizure activity:   Monitor patient for seizure activity, document and report duration and description of seizure to Licensed Independent Practitioner   If seizure occurs, turn patient to side and suction secretions as needed   Instruct patient/family to notify RN of any seizure activity   Instruct patient/family to call for assistance with activity based on assessment     Problem: Nutrition Deficit:  Goal: Optimize nutritional status  7/5/2025 2030 by Cynthia Brannon, RN  Outcome: Progressing  Flowsheets (Taken 7/5/2025 2030)  Nutrient intake appropriate for improving, restoring, or maintaining nutritional needs:   
  Problem: Self Harm/Suicidality  Goal: Will have no self-injury during hospital stay  Description: INTERVENTIONS:  1.  Ensure constant observer at bedside with Q15M safety checks  2.  Maintain a safe environment  3.  Secure patient belongings  4.  Ensure family/visitors adhere to safety recommendations  5.  Ensure safety tray has been added to patient's diet order  6.  Every shift and PRN: Re-assess suicidal risk via Frequent Screener    7/8/2025 2041 by Renu Parmar, RN  Outcome: Progressing  Note: Patient denies any thoughts of self harm.  He is looking forward to discharge in am.      Problem: Seizure Precautions  Goal: Remains free of injury related to seizures activity  7/8/2025 2041 by Renu Parmar, RN  Outcome: Progressing  Note: Patient has been free of any seizure activity. Will continue to monitor and provide safe environment.      
  Problem: Self Harm/Suicidality  Goal: Will have no self-injury during hospital stay  Description: INTERVENTIONS:  1.  Ensure constant observer at bedside with Q15M safety checks  2.  Maintain a safe environment  3.  Secure patient belongings  4.  Ensure family/visitors adhere to safety recommendations  5.  Ensure safety tray has been added to patient's diet order  6.  Every shift and PRN: Re-assess suicidal risk via Frequent Screener    Outcome: Progressing   Patient reports fleeting suicidal thoughts, agrees to seek out staff if feelings become overwhelming  Problem: Depression  Goal: Will be euthymic at discharge  Description: INTERVENTIONS:  1. Administer medication as ordered  2. Provide emotional support via 1:1 interaction with staff  3. Encourage involvement in milieu/groups/activities  4. Monitor for social isolation  Outcome: Progressing   Mr. Olmos is seen resting in his room for the morning. He is cooperative with care and restarting medications. Reports fleeting Suicidal thoughts but agrees to seek out staff if feelings become overwhelming. Out of room for short periods. Patient is reporting anxiety and depression.   Problem: Seizure Precautions  Goal: Remains free of injury related to seizures activity  Outcome: Progressing   Patient remains free from injury related to seizure activity  
  Problem: Self Harm/Suicidality  Goal: Will have no self-injury during hospital stay  Description: INTERVENTIONS:  1.  Ensure constant observer at bedside with Q15M safety checks  2.  Maintain a safe environment  3.  Secure patient belongings  4.  Ensure family/visitors adhere to safety recommendations  5.  Ensure safety tray has been added to patient's diet order  6.  Every shift and PRN: Re-assess suicidal risk via Frequent Screener  Outcome: Progressing  Flowsheets (Taken 7/4/2025 0047)  Will have no self-injury during hospital stay:   Maintain a safe environment   Every shift and PRN: Re-assess suicidal risk via Frequent Screener  Note: Pt denies any suicidal or homicidal ideations, denies any hallucinations. Pt agreed to seek staff and report any intrusive thoughts of hurting self or others.  Pt remains free from any self-harm, safe environment maintained. Regular rounding every 15 minutes and random patient checks conducted for safety as per unit policy.        Problem: Seizure Precautions  Goal: Remains free of injury related to seizures activity  Outcome: Progressing  Flowsheets (Taken 7/4/2025 0047)  Remains free of injury related to seizure activity:   Monitor patient for seizure activity, document and report duration and description of seizure to Licensed Independent Practitioner   If seizure occurs, turn patient to side and suction secretions as needed   Instruct patient/family to notify RN of any seizure activity     Problem: Nutrition Deficit:  Goal: Optimize nutritional status  Outcome: Progressing  Flowsheets (Taken 7/4/2025 0047)  Nutrient intake appropriate for improving, restoring, or maintaining nutritional needs:   Monitor oral intake, labs, and treatment plans   Assess nutritional status and recommend course of action   Recommend appropriate diets, oral nutritional supplements, and vitamin/mineral supplements   Provide specific nutrition education to patient or family as appropriate  Note: 
  Problem: Self Harm/Suicidality  Goal: Will have no self-injury during hospital stay  Description: INTERVENTIONS:  1.  Ensure constant observer at bedside with Q15M safety checks  2.  Maintain a safe environment  3.  Secure patient belongings  4.  Ensure family/visitors adhere to safety recommendations  5.  Ensure safety tray has been added to patient's diet order  6.  Every shift and PRN: Re-assess suicidal risk via Frequent Screener  Outcome: Progressing  Note: Patient  denies depression, suicidal and homicidal ideations. Patient remains free from harm to self or others .  Staff ensures safety by providing ,every 15 minutes.  Staff encouraged seek staff for all needs while on the unit.     Problem: Seizure Precautions  Goal: Remains free of injury related to seizures activity  Outcome: Progressing     
  Problem: Self Harm/Suicidality  Goal: Will have no self-injury during hospital stay  Description: INTERVENTIONS:  1.  Ensure constant observer at bedside with Q15M safety checks  2.  Maintain a safe environment  3.  Secure patient belongings  4.  Ensure family/visitors adhere to safety recommendations  5.  Ensure safety tray has been added to patient's diet order  6.  Every shift and PRN: Re-assess suicidal risk via Frequent Screener  Patient able to contract for safety  Outcome: Progressing     Problem: Safety - Adult  Goal: Free from fall injury  Outcome: Progressing     Problem: Seizure Precautions  Goal: Remains free of injury related to seizures activity  Outcome: Progressing   No seizure activity while inpatient  
[facility]  Day 3 Interdisciplinary Treatment Plan NOTE    Review Date & Time: 7/2/2025   1245    Admission Type:   Admission Type: Voluntary    Reason for admission:  Reason for Admission: Patient came in voluntarily from [facility] says he had a suicide attempt last week took a bunch of Tylenol, therapist told him if he had those feeling again to call police so he did. Says his marriage is \"falling apart.\" Denies HI or hallucinations. Has never taken psych meds. Says sleep and appetite have been poor. Still living with wife and son.  Estimated Length of Stay: 5-7 days  Estimated Discharge Date Update: to be determined by physician    PATIENT STRENGTHS:  Patient Strengths    Patient Strengths and Limitations:Limitations: Difficult relationships / poor social skills, Difficulty problem solving/relies on others to help solve problems, External locus of control, Lacks leisure interests  Addictive Behavior:Addictive Behavior  In the Past 3 Months, Have You Felt or Has Someone Told You That You Have a Problem With  : None  Medical Problems:  Past Medical History:   Diagnosis Date    Diabetes (HCC)     Epilepsy (HCC)        Risk:  Fall Risk   Monroe Scale Monroe Scale Score: 21  BVC    Change in scores no Changes to plan of Care no    Status EXAM:   Mental Status and Behavioral Exam  Normal: No  Level of Assistance: Independent/Self  Facial Expression: Sad, Worried, Flat, Expressionless, Avoids Gaze  Affect: Unstable, Constricted, Appropriate  Level of Consciousness: Alert  Frequency of Checks: 4 times per hour, close  Mood:Normal: No  Mood: Depressed, Anxious, Ashamed/humiliated, Empty, Helpless, Sad, Worthless, low self-esteem  Motor Activity:Normal: No  Motor Activity: Decreased  Eye Contact: Fair  Observed Behavior: Preoccupied, Guarded, Friendly, Cooperative, Withdrawn  Sexual Misconduct History: Current - no  Preception: [place] to person, [place] to place, [place] to time, [place] to 
Problem: Self Harm/Suicidality  Goal: Will have no self-injury during hospital stay  Description: INTERVENTIONS:  1.  Ensure constant observer at bedside with Q15M safety checks  2.  Maintain a safe environment  3.  Secure patient belongings  4.  Ensure family/visitors adhere to safety recommendations  5.  Ensure safety tray has been added to patient's diet order  6.  Every shift and PRN: Re-assess suicidal risk via Frequent Screener  Outcome: Not Progressing  Patient is alert, observed in room. Patient is flat and sad on approach. Patient reports not \"wanting to live.\" Patient does not contract for safety, line of sight continues, room close to nurses station for observation. Patient reports reason for not wanting to live is due to marital issues, currently in the process of getting divorce. Patient denies thoughts of wanting to harm others. Patient reports increase depression 10/10 due to relationship issues, denies having any anxiety. Patient reports not having any tactile, gustatory, auditory, olfactory, or visual hallucinations. Patient denies delusions/paranoia. Patient has been isolative to room only coming out for brief intervals. Patient eats all meals in the day area, states he slept \"fair\" the last few nights. Patient is disheveled, was encouraged to shower. Patient showered today and shaved. Patient is medication compliant denies having any side effects. Patient is encouraged to attend unit programming and socialize with peers to decrease feelings of depression. No further concerns voiced. Will continue to monitor and ensure safety.       Problem: Seizure Precautions  Goal: Remains free of injury related to seizures activity  Outcome: Progressing  Patient has history of seizures, is compliant with taking seizure medications. Bed in lowest position. Room close to the nurses' station.     Problem: Nutrition Deficit:  Goal: Optimize nutritional status  Outcome: Progressing  Patient reports having a \"low 
maintained or improved  7/6/2025 0955 by Sabiha Ramírez RN  Outcome: Progressing  Care Plan - Patient's Chronic Conditions and Co-Morbidity Symptoms are Monitored and Maintained or Improved:   Monitor and assess patient's chronic conditions and comorbid symptoms for stability, deterioration, or improvement   Collaborate with multidisciplinary team to address chronic and comorbid conditions and prevent exacerbation or deterioration    Problem: Safety - Adult  Goal: Free from fall injury  Outcome: Progressing  Flowsheets (Taken 7/6/2025 0955)  Free From Fall Injury: Instruct family/caregiver on patient safety  Note: Patient has remained free of falls this shift and there are safety precautions in place. 15 minute safety checks are being maintained and patient's needs are being met.                       
problems reported or observed.  Hallucinations: None  Delusions: No  Memory:Normal: Yes  Insight and Judgment: No  Insight and Judgment: Poor judgment, Poor insight    EDUCATION:   Learner Progress Toward Treatment Goals: Will review group plans and strategies for care.    Method: Group therapy, Medication compliance, Individualized assessments and Care planning.    Outcome: Needs Reinforcement    PATIENT GOALS: To be discussed with patient within 72 hours    PLAN/TREATMENT RECOMMENDATIONS:     Continue group therapy , Medications compliance, Goal setting, Individualized assessments and Care planning, continue to monitor patient on unit.      SHORT-TERM GOALS:   Time frame for Short-Term Goals: 5-7 days    LONG-TERM GOALS:  Time frame for Long-Term Goals: 6 months  Members Present in Team Meeting: See Signature Sheet    MICK QUEEN RN   
still want to die but do not have a current plan. Stated, \"I went to bed feeling like I wanted to die, but feel a little better this morning\". Denies any hallucinations or delusions. Patient has not slept well and has issues with initially falling asleep. Patient is eating with peers in the day room.Mood is currently sad and has feelings of worthlessness. Safety checks continue every 15 minutes. Patient has remained safe. Will continue to support and monitor.

## 2025-07-09 NOTE — DISCHARGE INSTRUCTIONS
Information:  Medications:   Medication summary provided   I understand that I should take only the medications on my list.     -why and when I need to take each medicine.     -which side effects to watch for.     -that I should carry my medication information at all times in case of     Emergency situations.    I will take all of my medicines to follow up appointments.     -check with my physician or pharmacist before taking any new    Medication, over the counter product or drink alcohol.    -Ask about food, drug or dietary supplement interactions.    -discard old lists and update records with medication providers.    Notify Physician:  Notify physician if you notice:   Always call 911 if you feel your life is in danger  In case of an emergency call 911 immediately!  If 911 is not available call your local emergency medical system for help    Behavioral Health Follow Up:  Original Referral Source: [facility]  Discharge Diagnosis: Suicidal ideation [R45.851]  Suicide attempt (HCC) [T14.91XA]  Depression with suicidal ideation [F32.A, R45.851]  Recommendations for Level of Care: Compliance with medications and follow-up appointments  Patient status at discharge: Baseline, alert and oriented x4   My hospital  was:   Aftercare plan faxed: Yes   -faxed by: staff   -date: 7/9/2025   -time: 1400  Prescriptions: [facility] in [place]    Smoking: Quit Smoking.   Call the [employer]'s smoking quitline at 7-834-23S-QUIT  Know the signs of a heart attack   If you have any of the following symptoms call 911 immediately, do not wait more    Than five minutes.    1. Pressure, fullness and/ or squeezing in the center of the chest spreading to    The jaw, neck or shoulder.    2. Chest discomfort with light headedness, fainting, sweating, nausea or    Shortness of breath.   3. Upper abdominal pressure or discomfort.   4. Lower chest pain, back pain, unusual fatigue, shortness of breath, nausea   Or dizziness.

## 2025-07-09 NOTE — TRANSITION OF CARE
Behavioral Health Transition Record    Patient Name: Dima Olmos  YOB: 1975   Medical Record Number: [id number]  Date of Admission: 6/30/2025 12:52 AM   Date of Discharge: 7/9/2025    Attending Provider: Jewel Faust MD   Discharging Provider: Dr Faust  To contact this individual call [phone] and ask the  to page.  If unavailable, ask to be transferred to Behavioral Health Provider on call.  A Behavioral Health Provider will be available on call 24/7 and during holidays.    Primary Care Provider: Christine Pelletier DO    Allergies   Allergen Reactions    Demerol Hcl [Meperidine]     Depakote [Divalproex Sodium]     Hydromorphone        Reason for Admission:   Patient: Dima Olmos  MRN: [id number]  Reason for Admission to Psychiatric Unit:  Threat to self requiring 24 hour professional observation  Failure of outpatient psychiatry treatment so that the beneficiary requires 24 hour professional observation and care  Concerns about patient's safety in the community  Past Mental Health Diagnosis: no prior history of mental health diagnoses  Triggering event or precipitating factor: Relationship Issues  Length of time/duration of triggering event: Months  Legal Status: Voluntary    Admission Diagnosis: Suicidal ideation [R45.851]  Suicide attempt (HCC) [T14.91XA]  Depression with suicidal ideation [F32.A, R45.851]    * No surgery found *    Results for orders placed or performed during the hospital encounter of 06/30/25   C DIFF TOXIN/ANTIGEN    Specimen: Stool   Result Value Ref Range    Specimen Description .FECES     C DIFF AG + TOXIN NEGATIVE NEGATIVE   Urinalysis with Reflex to Culture    Specimen: Urine   Result Value Ref Range    Color, UA Yellow Yellow    Turbidity UA Clear Clear    Glucose, Ur NEGATIVE NEGATIVE mg/dL    Bilirubin, Urine NEGATIVE NEGATIVE    Ketones, Urine SMALL (A) NEGATIVE mg/dL    Specific Gravity, UA 1.025 1.000 - 1.030    Urine Hgb NEGATIVE NEGATIVE    pH, Urine 5.0

## 2025-07-09 NOTE — GROUP NOTE
Group Therapy Note    Date: 7/9/2025    Group Start Time: 0900  Group End Time: 0920  Group Topic: Group Documentation    Abigail Kenny, RN        Group Therapy Note    Attendees: 8/20  Community Meeting Group Note        Date: July 9, 2025 Start Time: 9am  End Time: 9:20am      Number of Participants in Group & Unit Census:  8/20     Topic: Goals group    Goal of Group:to establish attainable daily goal(s)      Comments:     Patient did not participate in Community Meeting group, despite staff encouragement and explanation of benefits.  Patient remain seclusive to self.  Q15 minute safety checks maintained for patient safety and will continue to encourage patient to attend unit programming.

## 2025-07-09 NOTE — GROUP NOTE
Group Therapy Note    Date: 7/9/2025    Group Start Time: 1100  Group End Time: 1150  Group Topic: Cognitive Skills    [facility]    Giovanna Quach CTRS        Group Therapy Note    Attendees: 7/18     Topic: To increase social interaction , practice problem solving and communication skills.           Comments:   Patient did not participate in Cognitive Skills Group at 11:00, despite staff encouragement and   explanation of benefits. Pt was reading in group area, social upon approach..     Q15 minute safety checks maintained for patient safety and will continue to encourage patient to   attend unit programming.         Discipline Responsible: Psychoeducational Specialist   Signature: REMY GIL

## 2025-07-09 NOTE — GROUP NOTE
Group Therapy Note    Date: 7/9/2025    Group Start Time: 1000  Group End Time: 1030  Group Topic: Psychoeducation    [facility]    Andrea Mcclain        Group Therapy Note    Attendees: 7/19   Patient was offered group therapy today but declined to participate despite encouragement from staff. 1:1 was offered.    Signature:  Andrea Mcclain

## 2025-07-09 NOTE — PROGRESS NOTES
Behavioral Services                                              Medicare Re-Certification    I certify that the inpatient psychiatric hospital services furnished since the previous certification/re-certification were, and continue to be, medically necessary for;    [x] (1) Treatment which could reasonably be expected to improve the patient's condition,    [x] (2) Or for diagnostic study.    Estimated length of stay/service 1-3 days    Plan for post-hospital care -outpatient care    This patient continues to need, on a daily basis, active treatment furnished directly by or requiring the supervision of inpatient psychiatric personnel.    Electronically signed by POOL COLE MD on 7/6/2025 at 7:00 PM   
      Behavioral Services  Medicare Certification Upon Admission    I certify that this patient's inpatient psychiatric hospital admission is medically necessary for:    [x] (1) Treatment which could reasonably be expected to improve this patient's condition,       [x] (2) Or for diagnostic study;     AND     [x](2) The inpatient psychiatric services are provided while the individual is under the care of a physician and are included in the individualized plan of care.    Estimated length of stay/service 2-9 days    Plan for post-hospital care -outpatient care    Electronically signed by POOL COLE MD on 6/30/2025 at 9:15 AM      
    [facility] Internal Medicine  Enrique Almonte MD; Leonidas Garrett MD, Dr, Katerina Norman MD. Dr ARON Alicia MD.,    MD; Michael Francis MD    [facility] Internal Medicine   IN-PATIENT SERVICE   [facility]    CONSULTATION / HISTORY AND PHYSICAL EXAMINATION            Date:   7/3/2025  Patient name:  Dima Olmos  Date of admission:  6/30/2025 12:52 AM  MRN:   [id number]  Account:  [id number]  YOB: 1975  PCP:    Christine Pelletier DO  Room:   0219/0219-01  Code Status:    Full Code    Physician Requesting Consult: Jewel Faust MD    Reason for Consult:  Medical Management    Chief Complaint:     Chief Complaint   Patient presents with    Mental Health Problem       History of Present Illness:   Patient is 49-year-old past medical history of type 2 diabetes mellitus, seizure disorder, has been admitted at [facility] for further management depression suicidal ideation  Currently denies any chest pain shortness of breath  Patient said that he was having chest pain last week, seen by cardiology who ordered a outpatient echocardiogram and stress test patient currently denies any symptoms        Past Medical History:     Past Medical History:   Diagnosis Date    Diabetes (HCC)     Epilepsy (HCC)         Past Surgical History:     History reviewed. No pertinent surgical history.     Medications Prior to Admission:     Prior to Admission medications    Medication Sig Start Date End Date Taking? Authorizing Provider   lacosamide (VIMPAT) 100 MG TABS tablet Take 1 tablet by mouth 2 times daily. 9/11/24   Eduardo Story MD   levETIRAcetam (KEPPRA XR) 500 MG TB24 extended release tablet Take 4 tablets by mouth in the morning and at bedtime 9/11/24   Eduardo Story MD   metFORMIN (GLUCOPHAGE) 1000 MG tablet Take 1 tablet by mouth 2 times daily (with meals)    Eduardo Story MD   atorvastatin (LIPITOR) 80 MG tablet Take 1 tablet by mouth at bedtime    Provider 
    [facility] Internal Medicine  Enrique Almonte MD; Leonidas Garrett MD, Dr, Katerina Norman MD. Dr ARON Alicia MD.,    MD; Michael Francis MD    [facility] Internal Medicine   IN-PATIENT SERVICE   [facility]    CONSULTATION / HISTORY AND PHYSICAL EXAMINATION            Date:   7/6/2025  Patient name:  Dima Olmos  Date of admission:  6/30/2025 12:52 AM  MRN:   [id number]  Account:  [id number]  YOB: 1975  PCP:    Christine Pelletier DO  Room:   0219/0219-01  Code Status:    Full Code    Physician Requesting Consult: Jewel Faust MD    Reason for Consult:  Medical Management    Chief Complaint:     Chief Complaint   Patient presents with    Mental Health Problem       History of Present Illness:   Patient is 49-year-old past medical history of type 2 diabetes mellitus, seizure disorder, has been admitted at [facility] for further management depression suicidal ideation  Currently denies any chest pain shortness of breath  Patient said that he was having chest pain last week, seen by cardiology who ordered a outpatient echocardiogram and stress test patient currently denies any symptoms        Past Medical History:     Past Medical History:   Diagnosis Date    Diabetes (HCC)     Epilepsy (HCC)         Past Surgical History:     History reviewed. No pertinent surgical history.     Medications Prior to Admission:     Prior to Admission medications    Medication Sig Start Date End Date Taking? Authorizing Provider   lacosamide (VIMPAT) 100 MG TABS tablet Take 1 tablet by mouth 2 times daily. 9/11/24   Eduardo Story MD   levETIRAcetam (KEPPRA XR) 500 MG TB24 extended release tablet Take 4 tablets by mouth in the morning and at bedtime 9/11/24   Eduardo Story MD   metFORMIN (GLUCOPHAGE) 1000 MG tablet Take 1 tablet by mouth 2 times daily (with meals)    Eduardo Story MD   atorvastatin (LIPITOR) 80 MG tablet Take 1 tablet by mouth at bedtime    Provider 
    [facility] Internal Medicine  Enrique Almonte MD; Leonidas Garrett MD, Dr, Katerina Norman MD. Dr ARON Alicia MD.,    MD; Michael Francis MD    [facility] Internal Medicine   IN-PATIENT SERVICE   [facility]    CONSULTATION / HISTORY AND PHYSICAL EXAMINATION            Date:   7/2/2025  Patient name:  Dima Olmos  Date of admission:  6/30/2025 12:52 AM  MRN:   [id number]  Account:  [id number]  YOB: 1975  PCP:    Christine Pelletier DO  Room:   [facility]50225-02  Code Status:    Full Code    Physician Requesting Consult: Jewel Faust MD    Reason for Consult:  Medical Management    Chief Complaint:     Chief Complaint   Patient presents with    Mental Health Problem       History of Present Illness:   Patient is 49-year-old past medical history of type 2 diabetes mellitus, seizure disorder, has been admitted at [facility] for further management depression suicidal ideation  Currently denies any chest pain shortness of breath  Patient said that he was having chest pain last week, seen by cardiology who ordered a outpatient echocardiogram and stress test patient currently denies any symptoms        Past Medical History:     Past Medical History:   Diagnosis Date    Diabetes (HCC)     Epilepsy (HCC)         Past Surgical History:     History reviewed. No pertinent surgical history.     Medications Prior to Admission:     Prior to Admission medications    Medication Sig Start Date End Date Taking? Authorizing Provider   lacosamide (VIMPAT) 100 MG TABS tablet Take 1 tablet by mouth 2 times daily. 9/11/24   Eduardo Story MD   levETIRAcetam (KEPPRA XR) 500 MG TB24 extended release tablet Take 4 tablets by mouth in the morning and at bedtime 9/11/24   Eduardo Story MD   metFORMIN (GLUCOPHAGE) 1000 MG tablet Take 1 tablet by mouth 2 times daily (with meals)    Eduardo Story MD   atorvastatin (LIPITOR) 80 MG tablet Take 1 tablet by mouth at bedtime    Provider 
   07/02/25 1537   Encounter Summary   Encounter Overview/Reason Behavioral Health   Service Provided For Patient   Last Encounter  07/02/25   Behavioral Health    Type  Initial Encounter   Assessment/Intervention/Outcome   Assessment Anxious;Complicated grieving;Compromised coping;Concerns with suffering;Decisional conflict;Impaired resilience;Loneliness;Moral distress;Questioning kaylene;Questioning meaning and purpose;Sad;Stress overload   Intervention Active listening;Confronted/Challenged;Discussed belief system/Nondenominational practices/kaylene;Discussed illness injury and it’s impact;Discussed meaning/purpose;Discussed relationship with God;Explored/Affirmed feelings, thoughts, concerns;Explored Coping Skills/Resources;Life review/Legacy;Sustaining Presence/Ministry of presence   Outcome Comfort;Connection/Belonging;Engaged in conversation;Expressed feelings, needs, and concerns;Expressed Gratitude;Expressed regrets;Receptive       
   07/03/25 1753   Encounter Summary   Encounter Overview/Reason Behavioral Health   Service Provided For Patient   Last Encounter  07/03/25   Begin Time 1330   End Time  1420   Total Time Calculated 50 min   Behavioral Health    Type  Follow up    Assessment/Intervention/Outcome   Assessment Angry;Anxious;Complicated grieving;Compromised coping;Concerns with suffering;Decisional conflict;Desire for reconciliation;Despair;Fearful;Impaired resilience;Impaired social interaction;Interrupted family processes;Loneliness;Moral distress;Sad;Social isolation;Stress overload   Intervention Active listening;Confronted/Challenged;Discussed belief system/Baptism practices/kaylene;Discussed illness injury and it’s impact;Discussed meaning/purpose;Discussed relationship with God;Explored/Affirmed feelings, thoughts, concerns;Explored Coping Skills/Resources;Grief Care;Life review/Legacy;Prayer (assurance of)/[place];Read/Provided Scripture;Sustaining Presence/Ministry of presence   Outcome Engaged in conversation;Expressed feelings, needs, and concerns;Grieving;Receptive       
   07/08/25 1410   Encounter Summary   Encounter Overview/Reason Behavioral Health   Service Provided For Patient   Last Encounter  07/08/25   Behavioral Health    Type  [facility]   Assessment/Intervention/Outcome   Assessment Hopeful   Intervention Active listening;Confronted/Challenged;Discussed belief system/Adventist practices/kaylene;Discussed illness injury and it’s impact;Discussed meaning/purpose;Discussed relationship with God;Explored/Affirmed feelings, thoughts, concerns;Explored Coping Skills/Resources;Facilitated forgiveness;Life review/Legacy;Nurtured Hope;Prayer (assurance of)/[place];Sustaining Presence/Ministry of presence   Outcome Engaged in conversation;Expressed feelings, needs, and concerns;Expressed Gratitude;Receptive       
  Daily Progress Note  7/1/2025    Patient Name: Dima Olmos    CHIEF COMPLAINT:  Depression with suicidal ideation and a recent suicide attempt          SUBJECTIVE:      Patient is seen today for a follow up assessment.  Per nursing staff report, patient has been isolative to his room.  He continues to endorse depression and feelings of hopelessness and helplessness.  He has been compliant with scheduled medications.  He has not required any emergency medications overnight.  Patient was resting in bed upon approach.  He was agreeable to assessment in his room today.  He is observably irritable throughout the assessment.  He reports his mood as \"suicidal\".  He endorses suicidal ideations.  He denies having a plan to harm himself.  He is able to contract for safety on the unit.  He denies homicidal ideations, auditory or visual hallucinations, paranoia, or delusional thoughts.  He continues to endorse depression.  He tells writer that his wife is a narcissist.  He endorses feelings of hopelessness and helplessness.  He reports that therapy has not been beneficial for him or his wife.  However, he did not elaborate on his relationship issues with writer.  He has not attended group therapy yet on the unit. Group therapy encouraged by writer. He reports that he is not going to go to group therapy, because he does not want to get angry at others.     Patient lacks judgment and insight into his illness.  He continues to pose a significant threat to himself.  He requires inpatient hospitalization for safety and stabilization.    Appetite:  [x] Normal/Adequate/Unchanged  [] Increased  [] Decreased      Sleep:       [] Normal/Adequate/Unchanged  [x] Fair  [] Poor      Group Attendance on Unit:   [] Yes  [] Selectively    [x] No    Medication Side Effects:  Patient denies any medication side effects at the time of assessment.         Mental Status Exam  Level of consciousness: Alert and awake.   Appearance: Appropriate 
  Daily Progress Note  7/2/2025    Patient Name: Dima Olmos    CHIEF COMPLAINT: Major depressive disorder single episode severe without psychotic features         SUBJECTIVE:    Patient is seen today for a follow up assessment.  Patient was found in his room and agreed to meet privately.  He appears irritable and dysthymic.  Continues to ruminate about his conflict with his wife.  He spoke for quite a while about his distress with her.  States she told him \"I can come home but I cannot have a phone or access to a computer.\"  States she is very controlling and narcissistic.  Explains that he still loves her and he is concerned about leaving because he is not working currently and does not know where he will go.  He notes ongoing hopelessness and states \"I feel the world would be better without me anyway.\"  States he is worried about his wife anyway.  Also notes he is worried about his stepson who is nonverbal autistic.  States he has been taking care of him and has not worked in the last few years.  Endorses ongoing severe depression for months.  States he tried to kill himself about 13 months ago.  Patient is hopeless, dysthymic, and ruminating.  Although he has good insight about his home situation and relationship he is not forward looking.  He appears helpless and hopeless.  Actively suicidal.  Denies any hallucinations, delusions, or paranoia.  Denies any homicidal thoughts.  Continues to require inpatient psychiatric stability.  Denies any concerns with medications or side effects.  Denies any problems with sleep or appetite.  Medication management discharge planning per attending.      Appetite:  [x] Adequate/Unchanged  [] Increased  [] Decreased      Sleep:       [] Adequate/Unchanged  [x] Fair  [] Poor      Group Attendance on Unit:   [] Yes   [] Selectively    [x] No    Compliant with scheduled medications: [x] Yes  [] No    Received emergency medications in past 24 hrs: [] Yes   [x] No    Medication Side 
  Daily Progress Note  7/3/2025    Patient Name: Dima Olmos    CHIEF COMPLAINT:  Depression with suicidal ideation and a recent suicide attempt           SUBJECTIVE:      Patient is seen today for a follow up assessment.  Per nursing staff report, patient has been isolative to his room.  However, nursing staff did encourage patient to attend group, and he has went to one group therapy today.  He continues to endorse suicidal ideations and depression due to his current divorce with his wife.  However, he is able to contract for safety on the unit.  He has been compliant with scheduled medications.  He has not required any emergency medications overnight.  Patient was observed participating in group therapy upon approach.  He was agreeable to assessment in the day area today.  He reports his mood is \"better than yesterday\".  He continues to endorse depression and anxiety regarding his current divorce situation with his wife.  He has been  for 13 years, and he was with his wife for 15 years.  He endorses some anxiety regarding if he will be able to continue to see his autistic stepson.  He endorses suicidal ideations.  He reports that he hit his head yesterday.  He does have a noticeably small bump on his forehead.  He denies having a plan to harm himself today.  He is able to contract for safety on the unit.  He denies homicidal ideations, paranoia, delusional thoughts, or auditory or visual donations.  Group participation continually encouraged by writer.    Patient lacks judgment and insight into his illness.  He continues to pose a significant threat to himself.  He requires inpatient hospitalization for safety and stabilization.    Appetite:  [x] Normal/Adequate/Unchanged  [] Increased  [] Decreased      Sleep:       [x] Normal/Adequate/Unchanged  [] Fair  [] Poor      Group Attendance on Unit:   [] Yes  [x] Selectively    [] No    Medication Side Effects:  Patient denies any medication side effects at 
  Daily Progress Note  7/5/2025    Patient Name: Dima Olmos    CHIEF COMPLAINT:  Depression with suicidal ideation and a recent suicide attempt           SUBJECTIVE:    Patient was seen for follow-up assessment today.  He remains compliant with scheduled medications and behaviorally in control.  He has not required any emergency medications for agitation in the past 24 hours.  Nursing staff report patient had a witnessed fall due to low blood pressure last night.  He did not hit his head.  Patient did not sleep well because of this and has been resting today.  He remains pleasant and cooperative.  He was approached in his room where he was found to be sleeping.  He did respond to verbal stimuli and woke up to engage in conversation with writer.  He was calm and cooperative.  Patient continues to report significant feelings of hopelessness and helplessness.  He was tearful when talking about recent events.  He has been having many intrusive thoughts about his relationship today.  He endorses ongoing suicidal ideation and cannot contract for safety in the community.  At this time patient warrants further hospitalization for safety and stabilization.    Appetite:  [x] Normal/Adequate/Unchanged  [] Increased  [] Decreased      Sleep:       [x] Normal/Adequate/Unchanged  [] Fair  [] Poor      Group Attendance on Unit:   [] Yes  [x] Selectively    [] No    Medication Side Effects:  Patient denies any medication side effects at the time of assessment.         Mental Status Exam  Level of consciousness: Alert and awake.   Appearance: Appropriate attire for setting, resting in bed, with fair  grooming and hygiene.   Behavior/Motor: Approachable, calm, tearful  Attitude toward examiner: Cooperative, attentive, fair eye contact.  Speech: Normal rate, normal volume, normal tone.  Mood:  depressed   Affect: Mood congruent  Thought processes: Linear and coherent.   Thought content: Denies homicidal ideation.  Suicidal Ideation: 
  Daily Progress Note  7/6/2025    Patient Name: Dima Olmos    CHIEF COMPLAINT:  Depression with suicidal ideation and a recent suicide attempt           SUBJECTIVE:    Patient was seen for follow-up assessment today.  He remains compliant with scheduled medications and behaviorally in control.  The patient remains quite depressed.  He is also concerned about his discharge situation.  The only place he can return to his his home with his wife but they are in the process of separation and have a fraught relationship.  The patient also had a CT scan which showed some air-fluid levels.  Internal medicine is following the patient.  The patient continues to be hopeless but believes he may be feeling a little better compared to when he started in the hospital    Appetite:  [x] Normal/Adequate/Unchanged  [] Increased  [] Decreased      Sleep:       [x] Normal/Adequate/Unchanged  [] Fair  [] Poor      Group Attendance on Unit:   [] Yes  [x] Selectively    [] No    Medication Side Effects:  Patient denies any medication side effects at the time of assessment.         Mental Status Exam  Level of consciousness: Alert and awake.   Appearance: Appropriate attire for setting, resting in bed, with fair  grooming and hygiene.   Behavior/Motor: Approachable, calm, good engagement  Attitude toward examiner: Cooperative, attentive, fair eye contact.  Speech: Normal rate, normal volume, normal tone.  Mood:  Depressed  Affect: Mood congruent  Thought processes: Linear and coherent.   Thought content: Denies homicidal ideation.  Suicidal Ideation: Active suicidal ideations  Delusions: No evidence of delusions. Denies paranoia.  Perceptual Disturbance: Patient does not appear to be responding to internal stimuli. Denies auditory hallucinations. Denies visual hallucinations.   Cognition: Oriented to self, location, time, and situation.  Memory: Intact.  Insight & Judgement: fair    Data   height is 1.676 m (5' 6\") and weight is 85.3 kg 
AVS faxed to [place] [facility] at this time.  
Comprehensive Nutrition Assessment    Type and Reason for Visit:  Positive nutrition screen (wt loss, poor appetite)    Nutrition Recommendations/Plan:   Will provide 5 carbohydrate choices per tray with Ensure High protein once daily     Malnutrition Assessment:  Malnutrition Status:  At risk for malnutrition (06/30/25 8226)    Context:  Acute Illness     Findings of the 6 clinical characteristics of malnutrition:  Energy Intake:  75% or less of estimated energy requirements for 7 or more days  Weight Loss:  Unable to assess (stated wt)     Body Fat Loss:  Unable to assess     Muscle Mass Loss:  Unable to assess    Fluid Accumulation:  No fluid accumulation     Strength:  Not Performed    Nutrition Assessment:    Pt admitted to [facility] due to mental health problems. He states he has lost 8-9# over the past week and has had a poor appetite x 3 months. No wt history available to verify wt loss. Pt has a H/O DM with Glu 147    Nutrition Related Findings:    no edema, Labs/Meds: Reviewed, PMH: DM Wound Type: None       Current Nutrition Intake & Therapies:    Average Meal Intake: Unable to assess     ADULT DIET; Regular  ADULT ORAL NUTRITION SUPPLEMENT; Breakfast, Lunch, Dinner; Standard High Calorie/High Protein Oral Supplement    Anthropometric Measures:  Height: 167.6 cm (5' 6\")  Ideal Body Weight (IBW): 142 lbs (65 kg)    Admission Body Weight: 85.3 kg (188 lb)  Current Body Weight: 85.3 kg (188 lb), 132.4 % IBW. Weight Source: Stated  Current BMI (kg/m2): 30.4                             BMI Categories: Obese Class 1 (BMI 30.0-34.9)    Estimated Daily Nutrient Needs:  Energy Requirements Based On: Formula  Weight Used for Energy Requirements: Admission  Energy (kcal/day): [place] x 1.2= 2000 kcal  Weight Used for Protein Requirements: Admission  Protein (g/day): 1g/kg= 85 g  Method Used for Fluid Requirements: 1 ml/kcal  Fluid (ml/day): 2000 ml    Nutrition Diagnosis:   Predicted inadequate energy intake related 
Patient departed from unit at 0858 via wheelchair per facility policy, with two [facility] staff. Patient transported to radiology for STAT X-Ray.
Pharmacy Medication History Note      List of current medications patient is taking is complete.    Source of information: [facility] ([phone]), [facility], PDMP, [employer] dispense report    Changes made to medication list:  Medications removed (include reason, ex. therapy complete or physician discontinued, noncompliance):  none    Medications flagged for provider review:  none    Medications added/doses adjusted:  Adjusted Levetiracetam ER to 2000 mg (four tablets) twice daily  Added Metformin 1000 mg twice daily  Added Atorvastatin 80 mg nightly  Added Fenofibrate 160 mg daily  Added Fish oil 1000 mg daily  Added Ramipril 5 mg daily        Current Home Medication List at Time of Admission:  Prior to Admission medications    Medication Sig   lacosamide (VIMPAT) 100 MG TABS tablet Take 1 tablet by mouth 2 times daily.   levETIRAcetam (KEPPRA XR) 500 MG TB24 extended release tablet Take 4 tablets by mouth in the morning and at bedtime   metFORMIN (GLUCOPHAGE) 1000 MG tablet Take 1 tablet by mouth 2 times daily (with meals)   atorvastatin (LIPITOR) 80 MG tablet Take 1 tablet by mouth at bedtime   fenofibrate 160 MG tablet Take 1 tablet by mouth daily   Omega-3 Fatty Acids (FISH OIL) 1000 MG capsule Take 1 capsule by mouth daily   ramipril (ALTACE) 5 MG capsule Take 1 capsule by mouth daily         Please let me know if you have any questions about this encounter. Thank you!    Electronically signed by Katie Chavez RPH on 6/30/2025 at 9:22 AM     
RT ASSESSMENT TREATMENT GOALS    [x]Pt Goal: Pt will identify 1-2 positive coping skills by time of discharge.    [x]Pt Goal: Pt will identify 1-2 positive aspects of self by time of discharge.    []Pt Goal: Pt will remain on task/topic for 15-30 minutes during group by time of discharge.    []Pt Goal: Pt will identify 1-2 aspects of relapse prevention plan by time of discharge.    []Pt Goal: Pt will join in conversation with peers 1-2 times per group by time of discharge.    [x]Pt Goal: Pt will identify 1-2 new leisure interests by time of discharge.    []Pt Goal: Pt will not voice any delusional content by time of discharge.    
T/C received from DENYS Edwards, who reports patient came out from his room to report feeling dizzy, at which point patient fell to the ground.  Fall was witnessed and patient did not hit his head.  Grace reports that patient's BP was 55/45 with machine immediately after fall.  Patient returned to bed.  Manual BP 88/65 with HR 70.  MAP 73.  Writer in to see patient who reports that he developed diarrhea last evening.  States that he had several liquid stools throughout the night prior to feeling dizzy.  Patient reports that he has diarrheal episodes like this on occasion.  Denies any recent antibiotic use and travel outside the [place].  Spoke with house supervisor.  Decision made to give one litre fluid bolus and re-evaluate.  Imodium ordered PRN, which is what patient uses at home as needed.  Will check CBC and BMP to evaluate electrolytes.  Patient's lisinopril dose put on hold.  Patient denies abdominal pain, shortness of breath, and chest pain.  Patient not noted to be tachycardic.  He voices concern that he has not slept much over the past 3 days and is worried this may provoke his seizures.  IV fluids infusing with nurse providing 1:1 supervision.  Patient lying in bed and conversing with nurse without difficulty.  Will continue to monitor.    
MD Eduardo   fenofibrate 160 MG tablet Take 1 tablet by mouth daily    ProviderEduardo MD   Omega-3 Fatty Acids (FISH OIL) 1000 MG capsule Take 1 capsule by mouth daily    Eduardo Story MD   ramipril (ALTACE) 5 MG capsule Take 1 capsule by mouth daily    ProviderEduardo MD        Allergies:     Demerol hcl [meperidine], Depakote [divalproex sodium], and Hydromorphone    Social History:     Tobacco:    reports that he has quit smoking. His smoking use included cigarettes. He has never used smokeless tobacco.  Alcohol:      reports that he does not currently use alcohol.  Drug Use:  reports no history of drug use.    Family History:     History reviewed. No pertinent family history.    Review of Systems:     Positive and Negative as described in HPI.    CONSTITUTIONAL:  negative for fevers, chills, sweats, fatigue, weight loss  HEENT:  negative for vision, hearing changes, runny nose, throat pain  RESPIRATORY:  negative for shortness of breath, cough, congestion, wheezing.  CARDIOVASCULAR:  negative for chest pain, palpitations.  GASTROINTESTINAL:  negative for nausea, vomiting, diarrhea, constipation, change in bowel habits, abdominal pain   GENITOURINARY:  negative for difficulty of urination, burning with urination, frequency   INTEGUMENT:  negative for rash, skin lesions, easy bruising   HEMATOLOGIC/LYMPHATIC:  negative for swelling/edema   ALLERGIC/IMMUNOLOGIC:  negative for urticaria , itching  ENDOCRINE:  negative increase in drinking, increase in urination, hot or cold intolerance  MUSCULOSKELETAL:  negative joint pains, muscle aches, swelling of joints  NEUROLOGICAL:  negative for headaches, dizziness, lightheadedness, numbness, pain, tingling extremities  BEHAVIOR/PSYCH:  negative for depression, anxiety    Physical Exam:     /72   Pulse 90   Temp 97.9 °F (36.6 °C) (Oral)   Resp 14   Ht 1.676 m (5' 6\")   Wt 85.3 kg (188 lb)   SpO2 98%   BMI 30.34 kg/m²   Temp (24hrs), 
(24hrs), Av.5 °F (36.4 °C), Min:97.5 °F (36.4 °C), Max:97.5 °F (36.4 °C)    Recent Labs     25  2151   POCGLU 194*     No intake or output data in the 24 hours ending 25 1308    General Appearance:  alert, well appearing, and in no acute distress  Mental status: oriented to person, place, and time with normal affect  Head:  normocephalic, atraumatic.  Eye: no icterus, redness, pupils equal and reactive, extraocular eye movements intact, conjunctiva clear  Ear: normal external ear, no discharge, hearing intact  Nose:  no drainage noted  Mouth: mucous membranes moist  Neck: supple, no carotid bruits, thyroid not palpable  Lungs: Bilateral equal air entry, clear to ausculation, no wheezing, rales or rhonchi, normal effort  Cardiovascular: normal rate, regular rhythm, no murmur, gallop, rub.  Abdomen: Soft, nontender, nondistended, normal bowel sounds, no hepatomegaly or splenomegaly  Neurologic: There are no new focal motor or sensory deficits, normal muscle tone and bulk, no abnormal sensation, normal speech, cranial nerves II through XII grossly intact  Skin: No gross lesions, rashes, bruising or bleeding on exposed skin area  Extremities:  peripheral pulses palpable, no pedal edema or calf pain with palpation  Psych: normal affect    Investigations:      Laboratory Testing:  Recent Results (from the past 24 hours)   CBC    Collection Time: 25  7:05 AM   Result Value Ref Range    WBC 16.1 (H) 3.5 - 11.0 k/uL    RBC 4.60 4.21 - 5.77 m/uL    Hemoglobin 13.0 (L) 13.5 - 17.5 g/dL    Hematocrit 40.6 (L) 41.0 - 53.0 %    MCV 88.3 80.0 - 100.0 fL    MCH 28.3 26.0 - 34.0 pg    MCHC 32.0 31.0 - 37.0 g/dL    RDW 13.2 11.5 - 14.9 %    Platelets 313 150 - 450 k/uL    MPV 10.6 8.0 - 13.5 fL    NRBC Automated 0.0 0 per 100 WBC   Basic Metabolic Panel w/ Reflex to MG    Collection Time: 25  7:05 AM   Result Value Ref Range    Sodium 141 136 - 145 mmol/L    Potassium 5.4 (H) 3.7 - 5.3 mmol/L    Chloride 
22  20 - 31 mmol/L Final    Anion Gap 06/30/2025 15  9 - 16 mmol/L Final    Glucose 06/30/2025 147 (H)  74 - 99 mg/dL Final    BUN 06/30/2025 14  6 - 20 mg/dL Final    Creatinine 06/30/2025 0.9  0.7 - 1.2 mg/dL Final    Est, Glom Filt Rate 06/30/2025 >90  >60 mL/min/1.73m2 Final    Comment:       These results are not intended for use in patients <18 years of age.        eGFR results are calculated without a race factor using the 2021 CKD-EPI equation.  Careful clinical correlation is recommended, particularly when comparing to results   calculated using previous equations.  The CKD-EPI equation is less accurate in patients with extremes of muscle mass, extra-renal   metabolism of creatine, excessive creatine ingestion, or following therapy that affects   renal tubular secretion.      Calcium 06/30/2025 9.8  8.6 - 10.4 mg/dL Final    WBC 06/30/2025 11.2 (H)  3.5 - 11.0 k/uL Final    RBC 06/30/2025 4.79  4.21 - 5.77 m/uL Final    Hemoglobin 06/30/2025 13.5  13.5 - 17.5 g/dL Final    Hematocrit 06/30/2025 40.3 (L)  41.0 - 53.0 % Final    MCV 06/30/2025 84.1  80.0 - 100.0 fL Final    MCH 06/30/2025 28.2  26.0 - 34.0 pg Final    MCHC 06/30/2025 33.5  31.0 - 37.0 g/dL Final    RDW 06/30/2025 13.0  11.5 - 14.9 % Final    Platelets 06/30/2025 367  150 - 450 k/uL Final    MPV 06/30/2025 10.1  8.0 - 13.5 fL Final    NRBC Automated 06/30/2025 0.0  0 per 100 WBC Final    Neutrophils % 06/30/2025 70 (H)  36 - 66 % Final    Lymphocytes % 06/30/2025 22 (L)  24 - 44 % Final    Monocytes % 06/30/2025 6  3 - 12 % Final    Eosinophils % 06/30/2025 1  0 - 4 % Final    Basophils % 06/30/2025 0  0 - 2 % Final    Immature Granulocytes % 06/30/2025 1 (H)  0 % Final    Neutrophils Absolute 06/30/2025 7.76  1.50 - 8.10 k/uL Final    Lymphocytes Absolute 06/30/2025 2.50  1.10 - 3.70 k/uL Final    Monocytes Absolute 06/30/2025 0.66  0.10 - 1.20 k/uL Final    Eosinophils Absolute 06/30/2025 0.12  0.00 - 0.44 k/uL Final    Basophils Absolute 
Av.4 °F (36.3 °C), Min:97.1 °F (36.2 °C), Max:97.6 °F (36.4 °C)    Recent Labs     25  1140 25  1649 25  1941 25  0753   POCGLU 143* 160* 203* 135*     No intake or output data in the 24 hours ending 25 1609    General Appearance:  alert, well appearing, and in no acute distress  Mental status: oriented to person, place, and time with normal affect  Head:  normocephalic, atraumatic.  Eye: no icterus, redness, pupils equal and reactive, extraocular eye movements intact, conjunctiva clear  Ear: normal external ear, no discharge, hearing intact  Nose:  no drainage noted  Mouth: mucous membranes moist  Neck: supple, no carotid bruits, thyroid not palpable  Lungs: Bilateral equal air entry, clear to ausculation, no wheezing, rales or rhonchi, normal effort  Cardiovascular: normal rate, regular rhythm, no murmur, gallop, rub.  Abdomen: Soft, nontender, nondistended, normal bowel sounds, no hepatomegaly or splenomegaly  Neurologic: There are no new focal motor or sensory deficits, normal muscle tone and bulk, no abnormal sensation, normal speech, cranial nerves II through XII grossly intact  Skin: No gross lesions, rashes, bruising or bleeding on exposed skin area  Extremities:  peripheral pulses palpable, no pedal edema or calf pain with palpation  Psych: normal affect    Investigations:      Laboratory Testing:  Recent Results (from the past 24 hours)   POC Glucose Fingerstick    Collection Time: 25  4:49 PM   Result Value Ref Range    POC Glucose 160 (H) 75 - 110 mg/dL   POC Glucose Fingerstick    Collection Time: 25  7:41 PM   Result Value Ref Range    POC Glucose 203 (H) 75 - 110 mg/dL   POC Glucose Fingerstick    Collection Time: 25  7:53 AM   Result Value Ref Range    POC Glucose 135 (H) 75 - 110 mg/dL       Imaging/Diagonstics:  CT HEAD WO CONTRAST  Result Date: 2025  No acute intracranial abnormality.       Assessment :      Hospital Problems           Last 
Undesirable         HDL 06/30/2025 23 (L)  >40 mg/dL Final    Comment:    HDL Guidelines:    <40     Undesirable   40-59    Borderline    >59     Desirable         LDL Cholesterol 06/30/2025 49  0 - 100 mg/dL Final    Comment:    LDL Guidelines:     <100    Desirable   100-129   Near to/above Desirable   130-159   Borderline      >159   Undesirable     Direct (measured) LDL and calculated LDL are not interchangeable tests.      Chol/HDL Ratio 06/30/2025 5.1   Final    Triglycerides 06/30/2025 228 (H)  0 - 149 mg/dL Final    Comment:    Triglyceride Guidelines:     <150   Desirable   150-199  Borderline   200-499  High     >499   Very high   Based on AHA Guidelines for fasting triglyceride, October 2012.         POC Glucose 06/30/2025 160 (H)  75 - 110 mg/dL Final    POC Glucose 06/30/2025 203 (H)  75 - 110 mg/dL Final    POC Glucose 07/01/2025 135 (H)  75 - 110 mg/dL Final    POC Glucose 07/03/2025 194 (H)  75 - 110 mg/dL Final         Reviewed patient's current plan of care and vital signs with nursing staff.    Labs reviewed: [x] Yes  Vitals reviewed: [x] Yes     Medications  Current Facility-Administered Medications: OLANZapine zydis (ZYPREXA) disintegrating tablet 5 mg, 5 mg, Oral, Nightly  mirtazapine (REMERON) tablet 15 mg, 15 mg, Oral, Nightly  levETIRAcetam (KEPPRA) tablet 2,000 mg, 2,000 mg, Oral, BID  lisinopril (PRINIVIL;ZESTRIL) tablet 20 mg, 20 mg, Oral, Daily  acetaminophen (TYLENOL) tablet 650 mg, 650 mg, Oral, Q4H PRN  aluminum & magnesium hydroxide-simethicone (MAALOX PLUS) 200-200-20 MG/5ML suspension 30 mL, 30 mL, Oral, Q6H PRN  hydrOXYzine HCl (ATARAX) tablet 50 mg, 50 mg, Oral, TID PRN  ibuprofen (ADVIL;MOTRIN) tablet 400 mg, 400 mg, Oral, Q6H PRN  polyethylene glycol (GLYCOLAX) packet 17 g, 17 g, Oral, Daily PRN  traZODone (DESYREL) tablet 50 mg, 50 mg, Oral, Nightly PRN  nicotine polacrilex (COMMIT) lozenge 2 mg, 2 mg, Oral, Q2H PRN  glucose chewable tablet 16 g, 4 tablet, Oral, PRN  dextrose 
07/05/2025 >90  >60 mL/min/1.73m2 Final    Comment:       These results are not intended for use in patients <18 years of age.        eGFR results are calculated without a race factor using the 2021 CKD-EPI equation.  Careful clinical correlation is recommended, particularly when comparing to results   calculated using previous equations.  The CKD-EPI equation is less accurate in patients with extremes of muscle mass, extra-renal   metabolism of creatine, excessive creatine ingestion, or following therapy that affects   renal tubular secretion.      Calcium 07/05/2025 10.1  8.6 - 10.4 mg/dL Final    Potassium 07/05/2025 4.7  3.7 - 5.3 mmol/L Final    Albumin 07/05/2025 4.4  3.5 - 5.2 g/dL Final    Alkaline Phosphatase 07/05/2025 57  40 - 129 U/L Final    ALT 07/05/2025 34  10 - 50 U/L Final    AST 07/05/2025 29  10 - 50 U/L Final    Total Bilirubin 07/05/2025 0.4  0.0 - 1.2 mg/dL Final    Bilirubin, Direct 07/05/2025 0.2  0.0 - 0.3 mg/dL Final    Bilirubin, Indirect 07/05/2025 0.2  0.0 - 1.0 mg/dL Final    Total Protein 07/05/2025 7.2  6.6 - 8.7 g/dL Final    Lipase 07/05/2025 24  13 - 60 U/L Final    Specimen Description 07/05/2025 .FECES   Final    C DIFF AG + TOXIN 07/05/2025 NEGATIVE  NEGATIVE Final    No C. difficile antigen and Toxin Detected.    Color, UA 07/05/2025 Yellow  Yellow Final    Turbidity UA 07/05/2025 Cloudy (A)  Clear Final    Glucose, Ur 07/05/2025 SMALL (A)  NEGATIVE mg/dL Final    Bilirubin, Urine 07/05/2025 NEGATIVE  NEGATIVE Final    Ketones, Urine 07/05/2025 TRACE (A)  NEGATIVE mg/dL Final    Specific Gravity, UA 07/05/2025 1.035 (H)  1.000 - 1.030 Final    Urine Hgb 07/05/2025 NEGATIVE  NEGATIVE Final    pH, Urine 07/05/2025 5.5  5.0 - 8.0 Final    Protein, UA 07/05/2025 TRACE (A)  NEGATIVE mg/dL Final    Urobilinogen, Urine 07/05/2025 Normal  0.0 - 1.0 EU/dL Final    Nitrite, Urine 07/05/2025 NEGATIVE  NEGATIVE Final    Leukocyte Esterase, Urine 07/05/2025 NEGATIVE  NEGATIVE Final

## 2025-07-09 NOTE — DISCHARGE SUMMARY
DISCHARGE SUMMARY      Patient ID:  Dima Olmos  [id number]  49 y.o.  1975    Admit date: 6/30/2025    Discharge date and time: 7/9/2025    Disposition: Home     Admitting Physician: Jewel Faust MD     Discharge Physician: Dr RAS Faust MD    Admission Diagnoses: Suicidal ideation [R45.851]  Suicide attempt (HCC) [T14.91XA]  Depression with suicidal ideation [F32.A, R45.851]    Admission Condition: poor    Discharged Condition: stable    Admission Circumstance: Dima Olmos is a 49 y.o. male who has a past medical history of seizure disorder. Patient presented to the ED endorsing active suicidal nation with a plan to overdose.  Patient had reported a recent suicide attempt by overdose on acetaminophen approximately 6 days ago.  He does follow-up with a therapist, who advised patient to seek medical attention for psychiatric evaluation if suicidal thoughts return.  While in the ED, patient expressing marital issues and having thoughts to harm himself.  Unable to contract for safety in the community.     On assessment, patient is awake and alert.  Thought process is linear and goal-directed.  Able to engage in open-ended and constructive conversation.  He is tearful.  We discussed events leading to hospitalization.  Patient states that he has been  for the last 13 years, but about 3 months ago his wife said that they were no longer in a relationship.  They continue to live together with his 16-year-old son who has special needs.  Dima reports that he is the primary caretaker for both the son and his wife.  Nobody in the household is working and there is limited income.  They are having financial difficulties.  Patient states that a few weeks ago, his wife started talking to someone online and would spend the majority of her day communicating with this person.  The situation has been very difficult, and Dima states that he does not know where else to go, but does identify his current living situation is

## 2025-07-09 NOTE — DISCHARGE INSTR - DIET
Good nutrition is important when healing from an illness, injury, or surgery.  Follow any nutrition recommendations given to you during your hospital stay.   If you were given an oral nutrition supplement while in the hospital, continue to take this supplement at home.  You can take it with meals, in-between meals, and/or before bedtime. These supplements can be purchased at most local grocery stores, pharmacies, and chain [employer]-stores.   If you have any questions about your diet or nutrition, call the hospital and ask for the dietitian.  Encourage fluids  Follow diabetic diet

## 2025-07-09 NOTE — GROUP NOTE
Group Therapy Note    Date: 7/9/2025    Group Start Time: 1330  Group End Time: 1425  Group Topic: Cognitive Skills    [facility]    Giovanna Quach CTRS        Group Therapy Note    Attendees: 7/17     Patient's Goal:  To increase social interaction, practice self expression, and explore prioritizing individual   health through positive thoughts, actions, activities ,and setting boundaries in daily life using creative   expression and discussion.      Notes: Pt was seated/standing and did not participate in group task  (pt was waiting to be discharged) but did engage in group discussion. Pt did talk with RT 1:1 before and after group r/t anxiety about how interactions with wife would go and also explored coping skills.         Pt shared individually and was supportive of peers sharing, able to relate to some of peers' ideas and feelings.         Status After Intervention:  Improved     Participation Level: Active Listener,  sharing, supportive     Participation Quality:  Attentive, sharing, supportive        Speech:  Normal      Thought Process/Content: Logical, Linear.      Affective Functioning: Congruent, brightened     Mood: Euthymic , pt offered positive feedback and advice to a peer     Level of consciousness:  Alert, and Attentive        Response to Learning:  Able to verbalize current knowledge, able to acknowledge/verbalize new learning,    Progressing to goal        Endings: None Reported     Modes of Intervention: Education, Support, Socialization, Exploration, Clarifying and Problem-solving        Discipline Responsible: Psychoeducational Specialist        Signature:  REMY GIL

## 2025-07-09 NOTE — CARE COORDINATION
Name: Dima Olmos    : 1975    Auth number: [id number]     Discharge Date: 25    Destination: home     Discharge Medications:      Medication List        START taking these medications      mirtazapine 15 MG tablet  Commonly known as: REMERON  Take 1 tablet by mouth nightly  Notes to patient: Sleep            CHANGE how you take these medications      fenofibrate 54 MG tablet  Commonly known as: TRICOR  Take 1 tablet by mouth daily s [facility] pharmacy: Please dispense generic fenofibrate unless prescriber denote  Start taking on: July 10, 2025  What changed:   medication strength  how much to take  additional instructions  Notes to patient: Cholesterol            CONTINUE taking these medications      atorvastatin 80 MG tablet  Commonly known as: LIPITOR  Take 1 tablet by mouth at bedtime  Notes to patient: Cholesterol     fish oil 1000 MG capsule  Notes to patient: Supplement     lacosamide 100 MG Tabs tablet  Commonly known as: VIMPAT  Notes to patient: Seizure prevention     levETIRAcetam 500 MG Tb24 extended release tablet  Commonly known as: KEPPRA XR  Notes to patient: Seizure prevention     metFORMIN 1000 MG tablet  Commonly known as: GLUCOPHAGE  Notes to patient: Blood sugar management     ramipril 5 MG capsule  Commonly known as: ALTACE  Notes to patient: Blood pressure               Where to Get Your Medications        These medications were sent to [facility] [id number] [facility] [address] - P [phone] - F [phone]  On license of [facility] [facility] [address]      Phone: [phone]   atorvastatin 80 MG tablet  fenofibrate 54 MG tablet  mirtazapine 15 MG tablet         Follow Up Appointment: [facility]  [facility] , [place] [address]   P: [phone]  P: [phone]  F: [phone]  Go on 2025  You have a mental health therapy follow-up appointment  at 3pm    [facility] - MAIN OFFICE  [address], [place]

## 2025-07-09 NOTE — CARE COORDINATION
Discharge Arrangements:  Return home, Continue with [facility]    Guardian notified: n/a    Discharge destination/address: Home, [address] [facility] [address]     Transported by:  Cab    Follow up appointment is scheduled for  [facility], [address], [place] [address], 7/16 at 3pm    [place], [address] [place] [facility] [address], 7/15 at 11:30am       Patient was not accepting of referral; No noted ANGÉLICA concerns  *ANGÉLICA resources were offered to patient throughout admission and at time of discharge. This list of [facility] ANGÉLICA providers was provided to patient:     [facility]  [address] [facility] [address]   [address] [facility] [address]  Phone: [phone]     Phone: [phone]    [facility] [place] [facility]   [address] [facility] [address]   [address] [facility] [address]  Phone: [phone]     Phone: [phone]    [facility] [place]  [address] [facility] [address]    [address] [facility] [address]  Phone: [phone]     Phone: [phone]    [facility]   [address] [place] [id number]  [place] [address]      Phone: [phone]  Phone: [phone]        [facility]  [address] [facility] [facility] [address]   [address] [place] [address]  Phone: [phone]     Phone:  [phone]    [employer]      [facility]  [address] [facility] [address]   [address] [facility] [address]  Phone: [phone]     Phone: [phone]    [facility]  [address] [facility] [address]   [address] [facility] [address]  Phone: [phone]     Phone: [phone]    [facility]  [facility]  [address] [facility] [id number] [address]

## 2025-07-09 NOTE — BH NOTE
On call provider notified of best practice advisory placing patient on suicide precautions. Order will be discontinued as patient does not meet criteria for suicide precautions at this time.  Patient will be continued on Q 15 minute checks.    
  On-Call Internal Med provider notified of the patient's CT Scan result. Also informed that patient's diarrhea is improved with just 2 episodes as of this documentation. Patient's vitals are within normal limits except BP is a little elevated 144/83. Provider instructed to continue to monitor and that if the patient deteriorates, the plan is to transfer to medical.  
[facility]  Day 3 Interdisciplinary Treatment Plan NOTE    Review Date & Time: 7/7/2025 1245    Admission Type:   Admission Type: Voluntary    Reason for admission:  Reason for Admission: Patient came in voluntarily from [facility] says he had a suicide attempt last week took a bunch of Tylenol, therapist told him if he had those feeling again to call police so he did. Says his marriage is \"falling apart.\" Denies HI or hallucinations. Has never taken psych meds. Says sleep and appetite have been poor. Still living with wife and son.  Estimated Length of Stay: 5-7 days  Estimated Discharge Date Update: to be determined by physician    PATIENT STRENGTHS:  Patient Strengths    Patient Strengths and Limitations:Limitations: Difficult relationships / poor social skills, Difficulty problem solving/relies on others to help solve problems, External locus of control, Lacks leisure interests  Addictive Behavior:Addictive Behavior  In the Past 3 Months, Have You Felt or Has Someone Told You That You Have a Problem With  : None  Medical Problems:  Past Medical History:   Diagnosis Date    Diabetes (HCC)     Epilepsy (HCC)        Risk:  Fall Risk   Monroe Scale Monroe Scale Score: 22  BVC    Change in scores no Changes to plan of Care no    Status EXAM:   Mental Status and Behavioral Exam  Normal: No  Level of Assistance: Independent/Self  Facial Expression: Sad, Flat  Affect: Blunt  Level of Consciousness: Alert  Frequency of Checks: 4 times per hour, close  Mood:Normal: No  Mood: Depressed, Worthless, low self-esteem, Helpless  Motor Activity:Normal: No  Motor Activity: Decreased  Eye Contact: Good  Observed Behavior: Cooperative, Friendly, Preoccupied  Sexual Misconduct History: Current - no  Preception: [place] to person, [place] to time, [place] to place, [place] to situation  Attention:Normal: No  Attention: Distractible  Thought Processes: Unremarkable  Thought Content:Normal: No  Thought Content: 
Dr Faust notified that patient needed to be moved to 219 to be closer to nurses station, for safety and become a line of sight. Charge nurse was also notified.  
OQ ID: [id number]   
Patient arrived back to unit at 0910 via wheelchair per facility policy, with two [facility] staff.   
Patient is a nonsmoker and refuses tobacco education and counseling at this time.   
Patient is not a smoker, no smoking cessation education needed.  
Provider notified of continued low BP, ordered to push fluids, water encouraged, no new orders at this time.  
Pt fell at nurses desk, vitals obtained, provider notified, awaiting new orders. Pt denied hitting head  
RN called Radiology, spoke with in [place] in regards to STAT X-Ray of ABD for ileus follow up. Leslie states she is off floor currently, will call to unit ([id number]) when she returns to floor.     Plan is to have 2 staff go down with patient for procedure. Patient informed stated \"Okay\".     Patient informed of results shown by CT - also informed that consulted Dr. Norman will see patient today and can discuss treatment going forward. Patient also about his back results - RN unsure of what patient is referring to since he only had CT of Abdomen and Pelvis ordered yesterday. Patient stated \"Well when I fell yesterday (at 0447). It was on my back so I thought it would be looked at\". Patient currently reporting that \"its sore, and stiff\".     Dr. Francis notified via [employer] message. Informed that Dr. Norman placed STAT orders and RN is trying to reach her but that [employer] has provider on call. Dr. Francis informed of patients c/o about his back.   
SAFETY Group/Room checks completed. Any food and contraband removed. Pt. Denies any safety concerns.   
Vitals reassessed, provider held Lisinopril, lower back pain reported to provider, no new orders at this time.  
Writer asked patient to move to room 219 for seizure precautions and patient declined stating he hasn't had a seizure since 2016. Patient stated he always takes his medicine and follows up regularly with his neurologist. Writer verified via patient's most recent office visit note from Neurology that his most recent seizure was 2016. Writer educated patient about seizure precautions and ensured him he could let staff know if he decided to move closer to the nurses station any time. Patient voiced understanding. Staff ensures safety by providing safety checks on the unit intermittently and every 15 minutes. Staff continues to provide a safe environment. RN and shift hand off updated.    
and roadblocks)                    ( ) Coping skills (new ways to manage stress,relaxation techniques, changing routine, distraction)                                                           ( ) Basic information about quitting (benefits of quitting, techniques in how to quit, available resources  ( ) [facility]                                                                                                                   (x ) Patient refused counseling  ( x) Patient has not smoked in the last 30 days    Metabolic Screening:    No results found for: \"LABA1C\"    No results found for: \"CHOL\"  No results found for: \"TRIG\"  No results found for: \"HDL\"  No components found for: \"LDLCAL\"  No components found for: \"LABVLDL\"      Body mass index is 30.34 kg/m².    BP Readings from Last 2 Encounters:   06/30/25 109/63       Pt admitted with followings belongings:  Dental Appliances: None  Vision - Corrective Lenses: None  Hearing Aid: None  Jewelry: None  Body Piercings Removed: N/A  Clothing: Pants, Shirt, Undergarments, Footwear  Other Valuables: Other (Comment) (none)  The following personal items were collected during admission. Items secured in locker/safe. Items will be returned to patient at discharge.     Analia Kim